# Patient Record
Sex: FEMALE | Race: WHITE
[De-identification: names, ages, dates, MRNs, and addresses within clinical notes are randomized per-mention and may not be internally consistent; named-entity substitution may affect disease eponyms.]

---

## 2017-12-28 ENCOUNTER — HOSPITAL ENCOUNTER (INPATIENT)
Dept: HOSPITAL 36 - ER | Age: 72
LOS: 5 days | Discharge: HOME | DRG: 190 | End: 2018-01-02
Payer: MEDICARE

## 2017-12-28 DIAGNOSIS — I50.9: ICD-10-CM

## 2017-12-28 DIAGNOSIS — Z87.891: ICD-10-CM

## 2017-12-28 DIAGNOSIS — J18.9: ICD-10-CM

## 2017-12-28 DIAGNOSIS — J44.0: ICD-10-CM

## 2017-12-28 DIAGNOSIS — Z91.013: ICD-10-CM

## 2017-12-28 DIAGNOSIS — E11.21: ICD-10-CM

## 2017-12-28 DIAGNOSIS — E66.01: ICD-10-CM

## 2017-12-28 DIAGNOSIS — I25.10: ICD-10-CM

## 2017-12-28 DIAGNOSIS — E11.65: ICD-10-CM

## 2017-12-28 DIAGNOSIS — E78.5: ICD-10-CM

## 2017-12-28 DIAGNOSIS — E11.40: ICD-10-CM

## 2017-12-28 DIAGNOSIS — J44.1: Primary | ICD-10-CM

## 2017-12-28 DIAGNOSIS — R09.02: ICD-10-CM

## 2017-12-28 DIAGNOSIS — Z95.5: ICD-10-CM

## 2017-12-28 DIAGNOSIS — G47.33: ICD-10-CM

## 2017-12-28 DIAGNOSIS — E11.22: ICD-10-CM

## 2017-12-28 DIAGNOSIS — E88.9: ICD-10-CM

## 2017-12-28 DIAGNOSIS — N18.9: ICD-10-CM

## 2017-12-28 DIAGNOSIS — J20.9: ICD-10-CM

## 2017-12-28 DIAGNOSIS — H54.8: ICD-10-CM

## 2017-12-28 DIAGNOSIS — I13.0: ICD-10-CM

## 2017-12-28 LAB
ANION GAP SERPL CALC-SCNC: 10.9 MMOL/L (ref 7–16)
BASOPHILS # BLD AUTO: 0 TH/CUMM (ref 0–0.2)
BASOPHILS NFR BLD AUTO: 0.6 % (ref 0–2)
BUN SERPL-MCNC: 34 MG/DL (ref 7–25)
CALCIUM SERPL-MCNC: 8.7 MG/DL (ref 8.6–10.3)
CHLORIDE SERPL-SCNC: 103 MEQ/L (ref 98–107)
CO2 SERPL-SCNC: 23.3 MEQ/L (ref 21–31)
CREAT SERPL-MCNC: 2.4 MG/DL (ref 0.6–1.2)
EOSINOPHIL # BLD AUTO: 0.1 TH/CMM (ref 0.1–0.4)
EOSINOPHIL NFR BLD AUTO: 1.2 % (ref 0–5)
ERYTHROCYTE [DISTWIDTH] IN BLOOD BY AUTOMATED COUNT: 13.7 % (ref 11.5–20)
GLUCOSE SERPL-MCNC: 295 MG/DL (ref 70–105)
HCT VFR BLD CALC: 33.5 % (ref 41–60)
HGB BLD-MCNC: 11.4 GM/DL (ref 12–16)
LYMPHOCYTE AB SER FC-ACNC: 1.3 TH/CMM (ref 1.5–3)
LYMPHOCYTES NFR BLD AUTO: 16.3 % (ref 20–50)
MCH RBC QN AUTO: 28.5 PG (ref 27–31)
MCHC RBC AUTO-ENTMCNC: 34 PG (ref 28–36)
MCV RBC AUTO: 83.7 FL (ref 81–100)
MONOCYTES # BLD AUTO: 0.5 TH/CMM (ref 0.3–1)
MONOCYTES NFR BLD AUTO: 6.7 % (ref 2–10)
NEUTROPHILS # BLD: 6.3 TH/CMM (ref 1.8–8)
NEUTROPHILS NFR BLD AUTO: 75.2 % (ref 40–80)
PLATELET # BLD: 252 TH/CMM (ref 150–400)
PMV BLD AUTO: 8.5 FL
POTASSIUM SERPL-SCNC: 4.2 MEQ/L (ref 3.5–5.1)
RBC # BLD AUTO: 4.01 MIL/CMM (ref 3.8–5.2)
SODIUM SERPL-SCNC: 133 MEQ/L (ref 136–145)
WBC # BLD AUTO: 8.2 TH/CMM (ref 4.8–10.8)

## 2017-12-28 PROCEDURE — Z7610: HCPCS

## 2017-12-28 NOTE — ED PHYSICIAN CHART
ED Chief Complaint/HPI





- Patient Information


Date Seen:: 12/28/17


Time Seen:: 21:00


Chief Complaint:: shortness of breath


History of Present Illness:: 





Patient's had shortness of breath since yesterday.  She denies chest pain.  

Patient has had mild nonproductive cough for 2 days.  Temperature not taken.


Historian:: Patient


Review:: Nurse's Note Reviewed





ED Review of Systems





- Review of Systems


General/Constitutional: No fever, No chills


Skin: No skin lesions


Head: No headache


Eyes: No loss of vision


ENT: No earache


Neck: No neck pain


Cardio Vascular: No chest pain, No palpitations


GI: No nausea, No vomiting


G/U: No dysuria


Endocrine: No polyuria, No polydipsia


Psychiatric: No prior psych history





ED Past Medical History





- Past Medical History


Past Medical History: HTN (diabetic neuropathy; legally blind), DM, Other


Family History: None


Social History: Non Smoker, No Alcohol


Surgical History: other (coronary stents)


Psychiatricy History: None





Family Medical History





- Family Member


  ** Mother


History Unknown: Yes





ED Physical Exam





- Physical Examination


General/Constitutional: Well-developed, well-nourished, Alert, No distress


Head: Atraumatic


Eyes: Lids, conjuctiva normal, PERRL


Skin: Nl inspection, No rash


ENMT: External ears, nose nl, TM canals nl, Nasal exam nl, Lips, teeth, gums nl


Other ENMT comments:: 





Dentures


Neck: No nuchal rigidity


Respiratory: Nl effort/Exclusion, No Wheeze/Rhonchi/Rales


Cardio Vascular: RRR, No murmur, gallop, rubs


GI: No tenderness/rebounding/guarding


: No CVA tenderness


Other Extremities comments:: 





2 out of 4 pretibial pitting edema


Neuro/Psych: No focal deficits


Misc: No paraspinal tenderness





ED Labs/Radiology/EKG Results





- Lab Results


Results: 





 Laboratory Results - last 24 hr











  12/28/17 12/28/17 12/28/17





  21:33 21:33 21:33


 


WBC  8.2  


 


RBC  4.01  


 


Hgb  11.4 L  


 


Hct  33.5 L  


 


MCV  83.7  


 


MCH  28.5  


 


MCHC Differential  34.0  


 


RDW  13.7  


 


Plt Count  252  


 


MPV  8.5  


 


Neutrophils %  75.2  


 


Lymphocytes %  16.3 L  


 


Monocytes %  6.7  


 


Eosinophils %  1.2  


 


Basophils %  0.6  


 


Sodium   133 L 


 


Potassium   4.2 


 


Chloride   103 


 


Carbon Dioxide   23.3 


 


Anion Gap   10.9 


 


BUN   34 H 


 


Creatinine   2.4 H 


 


Est GFR ( Amer)   TNP 


 


Est GFR (Non-Af Amer)   TNP 


 


BUN/Creatinine Ratio   14.2 


 


Glucose   295 H 


 


Calcium   8.7 


 


Troponin I   


 


B-Natriuretic Peptide    551.0 H














  12/28/17





  21:33


 


WBC 


 


RBC 


 


Hgb 


 


Hct 


 


MCV 


 


MCH 


 


MCHC Differential 


 


RDW 


 


Plt Count 


 


MPV 


 


Neutrophils % 


 


Lymphocytes % 


 


Monocytes % 


 


Eosinophils % 


 


Basophils % 


 


Sodium 


 


Potassium 


 


Chloride 


 


Carbon Dioxide 


 


Anion Gap 


 


BUN 


 


Creatinine 


 


Est GFR ( Amer) 


 


Est GFR (Non-Af Amer) 


 


BUN/Creatinine Ratio 


 


Glucose 


 


Calcium 


 


Troponin I  0.03


 


B-Natriuretic Peptide 














- Radiology Results


Results: 





CXR cardiomegaly and pulmonary congestion





- EKG Interpretations


Rate & Rhythm: NSR


Axis: normal


Comments:: 





poor R wave progression





ED Assessment





- Assessment


General Assessment: 





Patient felt better after the breathing treatment.  Age-adjusted BNP would be 

negative for congestive heart failure.  Patient stated she quit smoking 27 

years ago.  Patient's shortness of breath is most likely due to exacerbation of 

COPD not congestive heart failure





ED Septic Shock





- .


Is Septic Shock (SBP<90, OR Lactate>4 mmol\L) present?: No





ED Reassessment (Disposition)





- Reassessment


Reassessment Condition:: Improved





- Diagnosis


Diagnosis:: 





Exacerbation COPD; diabetes; hyperglycemia; legally blind





- Patient Disposition


Admitted to:: Telemetry


Admitting Medical Physician:: Tone Handy


Condition at Disposition:: Stable

## 2017-12-29 LAB
APPEARANCE UR: CLEAR
BACTERIA #/AREA URNS HPF: (no result) /HPF
BILIRUB UR-MCNC: NEGATIVE MG/DL
CHOLEST SERPL-MCNC: 139 MG/DL (ref ?–200)
COLOR UR: YELLOW
EPI CELLS URNS QL MICRO: (no result) /LPF
GLUCOSE UR STRIP-MCNC: >=1000 MG/DL
HBA1C MFR BLD: 7.7 % (ref 4–6)
HDLC SERPL-MCNC: 35 MG/DL (ref 23–92)
HGB BLD-MCNC: 16 G/DL (ref 4–35)
KETONES UR STRIP-MCNC: NEGATIVE MG/DL
LEUKOCYTE ESTERASE UR-ACNC: NEGATIVE
MICRO URNS: YES
NITRITE UR QL STRIP: NEGATIVE
PCO2 BLDA: 42 MMHG (ref 35–45)
PH UR STRIP: 6 [PH] (ref 4.6–8)
PO2 BLDA: 51 MMHG (ref 80–100)
PROT UR STRIP-MCNC: >=300 MG/DL
RBC # UR STRIP: (no result) /UL
RBC #/AREA URNS HPF: (no result) /HPF (ref 0–5)
SAO2 % BLDA: 86 % (ref 92–100)
SP GR UR STRIP: 1.01 (ref 1–1.03)
TRIGL SERPL-MCNC: 195 MG/DL (ref ?–150)
URINALYSIS COMPLETE PNL UR: (no result)
UROBILINOGEN UR STRIP-ACNC: 0.2 E.U./DL (ref 0.2–1)
WBC #/AREA URNS HPF: (no result) /HPF (ref 0–5)

## 2017-12-29 RX ADMIN — INSULIN ASPART SCH UNITS: 100 INJECTION, SOLUTION INTRAVENOUS; SUBCUTANEOUS at 16:35

## 2017-12-29 RX ADMIN — INSULIN ASPART SCH UNITS: 100 INJECTION, SOLUTION INTRAVENOUS; SUBCUTANEOUS at 10:49

## 2017-12-29 RX ADMIN — INSULIN ASPART SCH UNITS: 100 INJECTION, SOLUTION INTRAVENOUS; SUBCUTANEOUS at 10:50

## 2017-12-29 RX ADMIN — ASPIRIN 81 MG SCH MG: 81 TABLET ORAL at 18:21

## 2017-12-29 RX ADMIN — INSULIN ASPART SCH UNITS: 100 INJECTION, SOLUTION INTRAVENOUS; SUBCUTANEOUS at 20:45

## 2017-12-29 RX ADMIN — Medication SCH TAB: at 10:03

## 2017-12-29 RX ADMIN — INSULIN ASPART SCH UNITS: 100 INJECTION, SOLUTION INTRAVENOUS; SUBCUTANEOUS at 16:34

## 2017-12-29 RX ADMIN — INSULIN ASPART SCH UNITS: 100 INJECTION, SOLUTION INTRAVENOUS; SUBCUTANEOUS at 06:44

## 2017-12-29 NOTE — CARDIOLOGY
12/29/2017



ECHOCARDIOGRAM



PATIENT OF:  Dr. Tone Handy



M-MODE ECHOCARDIOGRAM:  Mitral valve, anterior leaflet of mitral valve shows

normal excursion, EF velocity.  Posterior leaflet of mitral valve shows normal

excursion.  Left ventricular posterior wall shows increased thickness, normal

excursion.  Interventricular septum shows increased thickness, normal excursion,

hypertrophy of the left ventricle, ejection fraction 62%.  Left atrium enlarged

4.5 cm.  Aortic root shows normal dimension, normal excursion of aortic

leaflets.



CONCLUSION:  Hypertrophy of the left ventricle, left atrial enlargement,

ejection fraction 62%.



2D ECHO:  Long axis view showed normal sized left ventricle with hypertrophy of

the left ventricle.  Left atrium enlarged.  Aortic root shows normal dimension,

normal excursion of aortic leaflets.  Short axis view of mitral valve normal. 

Short axis view of aortic valve normal.  Apical four chamber view showed normal

sized left ventricle with hypertrophy of the left ventricle.  Left atrium

enlarged.  Aortic root showed normal dimension, normal excursion of aortic

leaflets.  Short axis view of mitral valve normal.  Short axis view of aortic

valve normal.  Apical four chamber view showed normal sized left ventricle with

hypertrophy of the left ventricle.  Left atrium enlarged.  Right ventricular

cavity, right atrium normal, no pericardial effusion.



CONCLUSION:  Hypertrophy of the left ventricle.  Left atrial enlargement,

ejection fraction 62%.



Doppler study shows moderate mitral regurgitation, mild tricuspid regurgitation,

moderate aortic regurgitation.





DD: 12/29/2017 16:47

DT: 12/29/2017 16:57

[facility]# [id number]  [id number]

## 2017-12-29 NOTE — CONSULTATION
DATE OF CONSULTATION:     12/29/2017



ATTENDING PHYSICIAN:  Tone Handy M.D.



REASON FOR CONSULTATION:  Elevated BUN and creatinine.



HISTORY OF PRESENT ILLNESS:  The patient is a 72-year-old female with nearly 40

years history of diabetes mellitus.  She came into the Emergency Room

complaining of shortness of breath and cough with fever.  So, she was admitted

and she was found to have elevated BUN, creatinine.  So, I am called in

consultation as I am covering for Dr. David Dyson.



PAST MEDICAL HISTORY:  Significant for diabetes mellitus for 38 years,

hypertension for the past few years.  She is legally blind.  She has diabetic

neuropathy.  She is an ex-smoker.



FAMILY HISTORY:  Noncontributory.



SOCIAL HISTORY:  The patient does not drink.  She quit smoking 25 years ago.



ALLERGIES:  She is allergic to shellfish.



REVIEW OF SYSTEMS:  As in history of present illness, all other systems reviewed

and found to be negative.



PHYSICAL EXAMINATION:

VITAL SIGNS:  Her blood pressure is 172/64, temperature is 96.6, pulse is 68,

respiration 18.

HEENT:  Normocephalic, atraumatic.  Pupils equal, round, reacting to light and

accommodation.  Extraocular movements are intact.

CARDIOVASCULAR:  S1, S2 heard, regular rate and rhythm.

LUNGS:  Clear to auscultation bilaterally.

ABDOMEN:  Soft.  No hepatosplenomegaly.

EXTREMITIES:  There is no cyanosis, clubbing.  There is 1+ pedal edema.

NEUROLOGIC:  Cranial nerves 2-12 are intact.  There is no focal deficit.



LABORATORY DATA:  Urine shows 3+ protein and 3+ glucose, 5-10 RBCs, 0-2 WBCs. 

B-type natriuretic peptide is 550.  Sodium 133, potassium 4.2, chloride 103,

bicarbonate 23.3, BUN 34, creatinine 2.4, glucose 295, calcium 8.7.  Troponin

0.03.  Arterial blood gas shows a pH of 7.42, pCO2 of 42, pO2 of 51, bicarbonate

26.5, WBC 8.2, hemoglobin 11.4, hematocrit 33.5, platelet count 252.



ASSESSMENT AND PLAN:

1.  Elevated BUN and creatinine.  The patient knows that she has chronic kidney

disease and she was told that her creatinine was 2 point something.  We will 
check

24-hour urine.

2.  Diabetes mellitus, cover with sliding scale insulin.

3.  Hypertension.  Continue antihypertensive medications.

4.  Shortness of breath with hypoxia.  This is probably chronic obstructive

pulmonary disease exacerbation, given steroids and breathing treatments.



Thank you very much for the privilege of consulting on your patient Dr. Handy.





DD: 12/29/2017 11:32

DT: 12/29/2017 11:59

JOB# [id number]  [id number]

MTDD

## 2017-12-29 NOTE — DIAGNOSTIC IMAGING REPORT
Exam: Portable chest x-ray.



HISTORY: Shortness of breath.



Findings:



Portable examination of the chest at 2136 hours reviewed, no prior

studies available comparison.



The study demonstrates congestive heart failure. Mediastinal structures

midline the heart is not enlarged. There is evidence for left basilar

infiltrate and superimposed effusion. Bony thorax intact. The aortic

arch calcified. Right basal atelectasis.



IMPRESSION:

1. Congestive heart failure

2. Left basilar infiltrate superimposed effusion. Right basilar

atelectasis. Follow-up examination is recommended.

## 2017-12-29 NOTE — CONSULTATION
DATE OF CONSULTATION:  12/29/2017



PULMONARY AND CRITICAL CARE CONSULTATION



REFERRING PHYSICIAN:  Tone Handy MD.



REASON FOR REFERRAL:  Persistent coughing and wheezing.



CONSULT NOTE:  This is a 72-year-old female who has had history of less than

20-pack-year smoker, has not smoked for last 25 years, presents with history of

two days of coughing, fever, chills, and some phlegm, though could not see the

color of it with some shortness of breath and chest discomfort.  As the

patient's symptoms persisted, the patient came to the hospital for further care

and necessary treatment.  The patient did have some chills, feels like she may

have flu.  Denies of any post-sinus dribbling or denies of any other related

issue.



PAST MEDICAL HISTORY:  Hypertension, diabetes mellitus, dyslipoproteinemia,

morbid obesity, question of suspect obstructive sleep apnea syndrome, and

smoking history less than 25-pack-year smoker.



ALLERGIES:  ALLERGIC TO SHELLFISH.



PAST SURGICAL HISTORY:  Nil.



SOCIAL HISTORY:  Currently disabled, lives by herself.



FAMILY HISTORY:  Noncontributory.



PHYSICAL EXAMINATION:

GENERAL:  This is an elderly looking female, awake, pleasant, and not in acute

distress.

VITAL SIGNS:  The patient's recorded vitals:  Temperature is 98, blood pressure

174/71, respirations 18, temperature is 97, saturation okay.

HEENT:  Examination of the head is essentially unremarkable.  Pupils appears to

be equal and reactive to light.  Conjunctivae are pink.  Oral cavity shows small

oropharyngeal opening.

NECK:  No nodes in the neck could be palpated.

CHEST:  Shows occasional rhonchi with diminished air entry.

HEART:  Regular.

ABDOMEN:  Distended, soft, and nontender.

EXTREMITIES:  Shows no peripheral edema.



LABORATORY DATA:  The patient's blood gases shows pO2 of 51, saturation 86 on 2

liters per minute.  Other diagnostic studies is negative.  All blood chemistries

is not available.  The patient's white count is 8.2, hemoglobin 11.4, and blood

gas as mentioned above.  Chemistry:  Triglycerides 195, other things have not

done.  The patient's chest x-ray shows portable technique, some heavy

bronchovascular marking mostly in the right side.



IMPRESSION:

1.  The patient has acute tracheobronchitis, questionable infiltrate.

2.  Suspect viral syndrome.

3.  Severe obstructive sleep apnea syndrome with metabolic disorder.



PLANS AND SUGGESTIONS:  We will go ahead and get influenza screening.  We will

go ahead and increase bronchodilator and also get sputum studies and empiric

antibiotics.  We will get a CT of the chest and repeat the blood gasses, etc.

and see how she does and go from there.





DD: 12/29/2017 22:53

DT: 12/29/2017 23:25

JOB# [id number]  [id number]

## 2017-12-30 LAB
ALBUMIN SERPL-MCNC: 3 GM/DL (ref 3.7–5.3)
ALBUMIN/GLOB SERPL: 0.9 {RATIO} (ref 1–1.8)
ALP SERPL-CCNC: 77 U/L (ref 34–104)
ALT SERPL-CCNC: 7 U/L (ref 7–52)
ANION GAP SERPL CALC-SCNC: 16.5 MMOL/L (ref 7–16)
AST SERPL-CCNC: 8 U/L (ref 13–39)
BASOPHILS # BLD AUTO: 0 TH/CUMM (ref 0–0.2)
BILIRUB DIRECT SERPL-MCNC: 0.06 MG/DL (ref 0–0.2)
BILIRUB SERPL-MCNC: 0.3 MG/DL (ref 0.3–1)
BSA: 2.1 M2
BUN SERPL-MCNC: 46 MG/DL (ref 7–25)
CALCIUM SERPL-MCNC: 8.6 MG/DL (ref 8.6–10.3)
CHLORIDE SERPL-SCNC: 100 MEQ/L (ref 98–107)
CO2 SERPL-SCNC: 20.5 MEQ/L (ref 21–31)
COLLECT DURATION TIME UR: 24 HOURS
CREAT CL 24H UR+SERPL-VRATE: 28 ML/MIN (ref 88–128)
CREAT SERPL-MCNC: 2.2 MG/DL (ref 0.6–1.2)
CREAT UR-MCNC: 32 MG/DL (ref 28–217)
EOSINOPHIL # BLD AUTO: 0 TH/CMM (ref 0.1–0.4)
ERYTHROCYTE [DISTWIDTH] IN BLOOD BY AUTOMATED COUNT: 13.5 % (ref 11.5–20)
FLUAV AG NPH QL IA: (no result)
FLUBV AG NPH QL IA: (no result)
GLOBULIN SER-MCNC: 3.3 GM/DL
GLUCOSE SERPL-MCNC: 459 MG/DL (ref 70–105)
HCT VFR BLD CALC: 32.6 % (ref 41–60)
HGB BLD-MCNC: 11.1 GM/DL (ref 12–16)
LYMPHOCYTE AB SER FC-ACNC: 0.8 TH/CMM (ref 1.5–3)
MANUAL DIFFERENTIAL PERFORMED BLD QL: YES
MCH RBC QN AUTO: 29.3 PG (ref 27–31)
MCHC RBC AUTO-ENTMCNC: 34.2 PG (ref 28–36)
MCV RBC AUTO: 85.9 FL (ref 81–100)
MONOCYTES # BLD AUTO: 0.1 TH/CMM (ref 0.3–1)
NEUTROPHILS # BLD: 9 TH/CMM (ref 1.8–8)
PCO2 BLDA: 40 MMHG (ref 35–45)
PLATELET # BLD: 235 TH/CMM (ref 150–400)
PMV BLD AUTO: 9.1 FL
PO2 BLDA: 72 MMHG (ref 80–100)
POTASSIUM SERPL-SCNC: 4 MEQ/L (ref 3.5–5.1)
PROT UR-MCNC: 548 MG/DL
RBC # BLD AUTO: 3.79 MIL/CMM (ref 3.8–5.2)
SAO2 % BLDA: 93 % (ref 92–100)
SODIUM SERPL-SCNC: 133 MEQ/L (ref 136–145)
SPECIMEN VOL UR: 3350 ML
WBC # BLD AUTO: 9.9 TH/CMM (ref 4.8–10.8)

## 2017-12-30 RX ADMIN — INSULIN DETEMIR SCH UNITS: 100 INJECTION, SOLUTION SUBCUTANEOUS at 22:40

## 2017-12-30 RX ADMIN — INSULIN ASPART SCH UNITS: 100 INJECTION, SOLUTION INTRAVENOUS; SUBCUTANEOUS at 06:40

## 2017-12-30 RX ADMIN — ASPIRIN 81 MG SCH MG: 81 TABLET ORAL at 09:27

## 2017-12-30 RX ADMIN — INSULIN ASPART SCH UNITS: 100 INJECTION, SOLUTION INTRAVENOUS; SUBCUTANEOUS at 12:42

## 2017-12-30 RX ADMIN — BUDESONIDE SCH MG: 0.5 SUSPENSION RESPIRATORY (INHALATION) at 07:52

## 2017-12-30 RX ADMIN — Medication SCH TAB: at 09:25

## 2017-12-30 RX ADMIN — INSULIN ASPART SCH UNITS: 100 INJECTION, SOLUTION INTRAVENOUS; SUBCUTANEOUS at 17:52

## 2017-12-30 RX ADMIN — IPRATROPIUM BROMIDE AND ALBUTEROL SULFATE SCH: .5; 3 SOLUTION RESPIRATORY (INHALATION) at 14:56

## 2017-12-30 RX ADMIN — INSULIN ASPART SCH UNITS: 100 INJECTION, SOLUTION INTRAVENOUS; SUBCUTANEOUS at 06:39

## 2017-12-30 RX ADMIN — IPRATROPIUM BROMIDE AND ALBUTEROL SULFATE SCH ML: .5; 3 SOLUTION RESPIRATORY (INHALATION) at 07:48

## 2017-12-30 RX ADMIN — IPRATROPIUM BROMIDE AND ALBUTEROL SULFATE SCH ML: .5; 3 SOLUTION RESPIRATORY (INHALATION) at 19:31

## 2017-12-30 RX ADMIN — BUDESONIDE SCH MG: 0.5 SUSPENSION RESPIRATORY (INHALATION) at 19:31

## 2017-12-30 RX ADMIN — INSULIN ASPART SCH: 100 INJECTION, SOLUTION INTRAVENOUS; SUBCUTANEOUS at 17:55

## 2017-12-30 RX ADMIN — INSULIN DETEMIR SCH UNITS: 100 INJECTION, SOLUTION SUBCUTANEOUS at 17:50

## 2017-12-30 RX ADMIN — INSULIN ASPART SCH UNITS: 100 INJECTION, SOLUTION INTRAVENOUS; SUBCUTANEOUS at 22:39

## 2017-12-30 RX ADMIN — IPRATROPIUM BROMIDE AND ALBUTEROL SULFATE SCH ML: .5; 3 SOLUTION RESPIRATORY (INHALATION) at 15:29

## 2017-12-30 NOTE — DIAGNOSTIC IMAGING REPORT
Renal ultrasound



HISTORY: Abnormal renal function test



The right kidney is normal in size (10.7 x 5.8 x 5.5 cm). 1.5 cm

sonolucent lesion is noted in the lower mid cortex consistent with a

cyst. No hydronephrosis.



The left kidney measures 11.3 x 5.5 x 5.6 cm. A 2.0 cm sonolucent lesion

is noted in the midportion of the kidney consistent with a cyst. No

hydronephrosis.



No intraluminal abnormalities seen within the urinary bladder.



IMPRESSION:

1. Limited exam due to patient's size and body habitus

2. Findings consistent with bilateral renal cysts

## 2017-12-31 LAB
ANION GAP SERPL CALC-SCNC: 10.9 MMOL/L (ref 7–16)
BUN SERPL-MCNC: 67 MG/DL (ref 7–25)
CALCIUM SERPL-MCNC: 8.2 MG/DL (ref 8.6–10.3)
CHLORIDE SERPL-SCNC: 104 MEQ/L (ref 98–107)
CO2 SERPL-SCNC: 22.9 MEQ/L (ref 21–31)
CREAT SERPL-MCNC: 2.6 MG/DL (ref 0.6–1.2)
GLUCOSE SERPL-MCNC: 234 MG/DL (ref 70–105)
POTASSIUM SERPL-SCNC: 3.8 MEQ/L (ref 3.5–5.1)
SODIUM SERPL-SCNC: 134 MEQ/L (ref 136–145)

## 2017-12-31 RX ADMIN — INSULIN ASPART SCH UNITS: 100 INJECTION, SOLUTION INTRAVENOUS; SUBCUTANEOUS at 12:32

## 2017-12-31 RX ADMIN — INSULIN ASPART SCH UNITS: 100 INJECTION, SOLUTION INTRAVENOUS; SUBCUTANEOUS at 04:46

## 2017-12-31 RX ADMIN — BUDESONIDE SCH MG: 0.5 SUSPENSION RESPIRATORY (INHALATION) at 07:29

## 2017-12-31 RX ADMIN — IPRATROPIUM BROMIDE AND ALBUTEROL SULFATE SCH ML: .5; 3 SOLUTION RESPIRATORY (INHALATION) at 15:11

## 2017-12-31 RX ADMIN — INSULIN ASPART SCH UNITS: 100 INJECTION, SOLUTION INTRAVENOUS; SUBCUTANEOUS at 02:32

## 2017-12-31 RX ADMIN — INSULIN DETEMIR SCH UNIT: 100 INJECTION, SOLUTION SUBCUTANEOUS at 21:41

## 2017-12-31 RX ADMIN — INSULIN ASPART SCH: 100 INJECTION, SOLUTION INTRAVENOUS; SUBCUTANEOUS at 07:03

## 2017-12-31 RX ADMIN — IPRATROPIUM BROMIDE AND ALBUTEROL SULFATE SCH ML: .5; 3 SOLUTION RESPIRATORY (INHALATION) at 11:48

## 2017-12-31 RX ADMIN — BUDESONIDE SCH: 0.5 SUSPENSION RESPIRATORY (INHALATION) at 19:25

## 2017-12-31 RX ADMIN — Medication SCH TAB: at 09:00

## 2017-12-31 RX ADMIN — INSULIN ASPART SCH: 100 INJECTION, SOLUTION INTRAVENOUS; SUBCUTANEOUS at 07:04

## 2017-12-31 RX ADMIN — ASPIRIN 81 MG SCH MG: 81 TABLET ORAL at 09:02

## 2017-12-31 RX ADMIN — INSULIN ASPART SCH UNITS: 100 INJECTION, SOLUTION INTRAVENOUS; SUBCUTANEOUS at 17:46

## 2017-12-31 RX ADMIN — IPRATROPIUM BROMIDE AND ALBUTEROL SULFATE SCH: .5; 3 SOLUTION RESPIRATORY (INHALATION) at 19:25

## 2017-12-31 RX ADMIN — INSULIN ASPART SCH UNITS: 100 INJECTION, SOLUTION INTRAVENOUS; SUBCUTANEOUS at 21:40

## 2017-12-31 RX ADMIN — IPRATROPIUM BROMIDE AND ALBUTEROL SULFATE SCH ML: .5; 3 SOLUTION RESPIRATORY (INHALATION) at 07:18

## 2017-12-31 RX ADMIN — INSULIN ASPART SCH UNITS: 100 INJECTION, SOLUTION INTRAVENOUS; SUBCUTANEOUS at 00:19

## 2018-01-01 LAB
ANION GAP SERPL CALC-SCNC: 10.1 MMOL/L (ref 7–16)
BASOPHILS # BLD AUTO: 0 TH/CUMM (ref 0–0.2)
BASOPHILS NFR BLD AUTO: 0.5 % (ref 0–2)
BUN SERPL-MCNC: 64 MG/DL (ref 7–25)
CALCIUM SERPL-MCNC: 8.1 MG/DL (ref 8.6–10.3)
CHLORIDE SERPL-SCNC: 103 MEQ/L (ref 98–107)
CO2 SERPL-SCNC: 26.5 MEQ/L (ref 21–31)
CREAT SERPL-MCNC: 2.3 MG/DL (ref 0.6–1.2)
EOSINOPHIL # BLD AUTO: 0.1 TH/CMM (ref 0.1–0.4)
EOSINOPHIL NFR BLD AUTO: 0.8 % (ref 0–5)
ERYTHROCYTE [DISTWIDTH] IN BLOOD BY AUTOMATED COUNT: 13.5 % (ref 11.5–20)
GLUCOSE SERPL-MCNC: 195 MG/DL (ref 70–105)
HCT VFR BLD CALC: 31.6 % (ref 41–60)
HGB BLD-MCNC: 10.9 GM/DL (ref 12–16)
LYMPHOCYTE AB SER FC-ACNC: 1.7 TH/CMM (ref 1.5–3)
LYMPHOCYTES NFR BLD AUTO: 18.8 % (ref 20–50)
MCH RBC QN AUTO: 29.3 PG (ref 27–31)
MCHC RBC AUTO-ENTMCNC: 34.4 PG (ref 28–36)
MCV RBC AUTO: 85 FL (ref 81–100)
MONOCYTES # BLD AUTO: 0.5 TH/CMM (ref 0.3–1)
MONOCYTES NFR BLD AUTO: 5.9 % (ref 2–10)
NEUTROPHILS # BLD: 6.9 TH/CMM (ref 1.8–8)
NEUTROPHILS NFR BLD AUTO: 74 % (ref 40–80)
PLATELET # BLD: 264 TH/CMM (ref 150–400)
PMV BLD AUTO: 9 FL
POTASSIUM SERPL-SCNC: 3.6 MEQ/L (ref 3.5–5.1)
RBC # BLD AUTO: 3.72 MIL/CMM (ref 3.8–5.2)
SODIUM SERPL-SCNC: 136 MEQ/L (ref 136–145)
WBC # BLD AUTO: 9.2 TH/CMM (ref 4.8–10.8)

## 2018-01-01 RX ADMIN — IPRATROPIUM BROMIDE AND ALBUTEROL SULFATE SCH ML: .5; 3 SOLUTION RESPIRATORY (INHALATION) at 07:24

## 2018-01-01 RX ADMIN — Medication SCH TAB: at 09:18

## 2018-01-01 RX ADMIN — BUDESONIDE SCH MG: 0.5 SUSPENSION RESPIRATORY (INHALATION) at 18:43

## 2018-01-01 RX ADMIN — INSULIN ASPART SCH UNITS: 100 INJECTION, SOLUTION INTRAVENOUS; SUBCUTANEOUS at 06:30

## 2018-01-01 RX ADMIN — IPRATROPIUM BROMIDE AND ALBUTEROL SULFATE SCH ML: .5; 3 SOLUTION RESPIRATORY (INHALATION) at 14:40

## 2018-01-01 RX ADMIN — ASPIRIN 81 MG SCH MG: 81 TABLET ORAL at 09:27

## 2018-01-01 RX ADMIN — INSULIN ASPART SCH UNITS: 100 INJECTION, SOLUTION INTRAVENOUS; SUBCUTANEOUS at 20:24

## 2018-01-01 RX ADMIN — ALBUTEROL SULFATE SCH MG: 2.5 SOLUTION RESPIRATORY (INHALATION) at 10:44

## 2018-01-01 RX ADMIN — INSULIN ASPART SCH UNITS: 100 INJECTION, SOLUTION INTRAVENOUS; SUBCUTANEOUS at 17:47

## 2018-01-01 RX ADMIN — INSULIN DETEMIR SCH UNIT: 100 INJECTION, SOLUTION SUBCUTANEOUS at 20:24

## 2018-01-01 RX ADMIN — INSULIN ASPART SCH UNITS: 100 INJECTION, SOLUTION INTRAVENOUS; SUBCUTANEOUS at 12:21

## 2018-01-01 RX ADMIN — IPRATROPIUM BROMIDE AND ALBUTEROL SULFATE SCH ML: .5; 3 SOLUTION RESPIRATORY (INHALATION) at 14:39

## 2018-01-01 RX ADMIN — IPRATROPIUM BROMIDE AND ALBUTEROL SULFATE SCH ML: .5; 3 SOLUTION RESPIRATORY (INHALATION) at 18:43

## 2018-01-01 RX ADMIN — IPRATROPIUM BROMIDE SCH MG: 0.5 SOLUTION RESPIRATORY (INHALATION) at 10:44

## 2018-01-01 NOTE — GENERAL PROGRESS NOTE
Subjective





- Review of Systems


Service Date: 18


Subjective: 





patient seen and examined chart reviewed patient doing well denied chest pain 

or shortness of breath





Objective





- Results


Result Diagrams: 


 18 05:50





 18 05:50


Recent Labs: 


 Laboratory Last Values











WBC  9.2 Th/cmm (4.8-10.8)   18  05:50    


 


RBC  3.72 Mil/cmm (3.80-5.20)  L  18  05:50    


 


Hgb  10.9 gm/dL (12-16)  L  18  05:50    


 


Hct  31.6 % (41.0-60)  L  18  05:50    


 


MCV  85.0 fl ()   18  05:50    


 


MCH  29.3 pg (27.0-31.0)   18  05:50    


 


MCHC Differential  34.4 pg (28.0-36.0)   18  05:50    


 


RDW  13.5 % (11.5-20.0)   18  05:50    


 


Plt Count  264 Th/cmm (150-400)   18  05:50    


 


MPV  9.0 fl  18  05:50    


 


Neutrophils %  74.0 % (40.0-80.0)   18  05:50    


 


Lymphocytes %  18.8 % (20.0-50.0)  L  18  05:50    


 


Monocytes %  5.9 % (2.0-10.0)   18  05:50    


 


Eosinophils %  0.8 % (0.0-5.0)   18  05:50    


 


Basophils %  0.5 % (0.0-2.0)   18  05:50    


 


Specimen Source  Arterial   17  13:45    


 


Sample Site  Right Radial   17  13:45    


 


pH  7.34  (7.35-7.45)  L  17  13:45    


 


pCO2  40.0 mmHg (35.0-45.0)   17  13:45    


 


pO2  72.0 mmHg (80.0-100.0)  L  17  13:45    


 


HCO3  21.8 mEq/L (20.0-26.0)   17  13:45    


 


Base Excess  -3.9 mEq/L (-3.0-3.0)  L  17  13:45    


 


O2 Saturation  93.0 % (92.0-100.0)   17  13:45    


 


Lan Test  Positive   17  13:45    


 


Vent Rate  NA   17  13:45    


 


Inspired O2  21   17  13:45    


 


Tidal Volume  NA   17  13:45    


 


PEEP  NA   17  13:45    


 


Pressure (ins/psv/peep)  NA   17  13:45    


 


Critical Value  ELENA PELON   17  13:45    


 


Sodium  136 mEq/L (136-145)   18  05:50    


 


Potassium  3.6 mEq/L (3.5-5.1)   18  05:50    


 


Chloride  103 mEq/L ()   18  05:50    


 


Carbon Dioxide  26.5 mEq/L (21.0-31.0)   18  05:50    


 


Anion Gap  10.1  (7.0-16.0)   18  05:50    


 


BUN  64 mg/dL (7-25)  H  18  05:50    


 


Creatinine  2.3 mg/dL (0.6-1.2)  H  18  05:50    


 


Est GFR ( Amer)  TNP   18  05:50    


 


Est GFR (Non-Af Amer)  TNP   18  05:50    


 


BUN/Creatinine Ratio  27.8   18  05:50    


 


Glucose  195 mg/dL ()  H D 18  05:50    


 


POC Glucose  435 MG/DL (70 - 105)  H  18  20:18    


 


Hemoglobin A1c %  7.7 % (4.0-6.0)  H  17  21:33    


 


Calcium  8.1 mg/dL (8.6-10.3)  L  18  05:50    


 


Total Bilirubin  0.3 mg/dL (0.3-1.0)   17  05:30    


 


Direct Bilirubin  0.06 mg/dL (0.0-0.2)   17  05:30    


 


AST  8 U/L (13-39)  L  17  05:30    


 


ALT  7 U/L (7-52)   17  05:30    


 


Alkaline Phosphatase  77 U/L ()   17  05:30    


 


Troponin I  0.02 ng/mL (0.01-0.05)   17  13:35    


 


B-Natriuretic Peptide  551.0 pg/mL (5.0-100.0)  H  17  21:33    


 


Total Protein  6.3 gm/dL (6.0-8.3)   17  05:30    


 


Albumin  3.0 gm/dL (3.7-5.3)  L  17  05:30    


 


Globulin  3.3 gm/dL  17  05:30    


 


Albumin/Globulin Ratio  0.9  (1.0-1.8)  L  17  05:30    


 


Triglycerides  195 mg/dL (<150)  H  17  05:15    


 


Cholesterol  139 mg/dL (<200)   17  05:15    


 


LDL Cholesterol Direct  75 mg/dL ()   17  05:15    


 


HDL Cholesterol  35 mg/dL (23-92)   17  05:15    


 


TSH  0.37 uIU/ml (0.34-5.60)   17  05:30    


 


Urine Source  CLEAN C   17  10:24    


 


Urine Color  YELLOW   17  10:24    


 


Urine Clarity  CLEAR  (CLEAR)   17  10:24    


 


Urine pH  6.0  (4.6 - 8.0)   17  10:24    


 


Ur Specific Gravity  1.015  (1.005-1.030)   17  10:24    


 


Urine Protein  >=300 mg/dL (NEGATIVE)   17  10:24    


 


Urine Glucose (UA)  >=1000 mg/dL (NEGATIVE)  H  17  10:24    


 


Urine Ketones  NEGATIVE mg/dL (NEGATIVE)   17  10:24    


 


Urine Blood  MODERATE  (NEGATIVE)  H  17  10:24    


 


Urine Nitrate  NEGATIVE  (NEGATIVE)   17  10:24    


 


Urine Bilirubin  NEGATIVE  (NEGATIVE)   17  10:24    


 


Urine Urobilinogen  0.2 E.U./dL (0.2 - 1.0)   17  10:24    


 


Ur Leukocyte Esterase  NEGATIVE  (NEGATIVE)   17  10:24    


 


Urine RBC  5-10 /hpf (0-5)  H  17  10:24    


 


Urine WBC  0-2 /hpf (0-5)   17  10:24    


 


Ur Epithelial Cells  FEW /lpf (FEW)   17  10:24    


 


Urine Bacteria  1+ /hpf (NONE SEEN)  H  17  10:24    


 


U Random Total Protein  548.0 mg/dL  17  12:00    


 


Urine Collection Time  24 hours  17  12:00    


 


Urine Total Volume  3350 ml  17  12:00    


 


Urine Creatinine  32.0 mg/dl (28.0-217.0)   17  12:00    


 


Creat Clearance 24 Hr  28 ml/min (88..00)  L  17  12:00    


 


U Tot Protein 24h, Calc  12093.0 mg/24 hr (0-165)  H  17  12:00    


 


Body Surface Area  2.10   17  12:00    


 


Influenza A (Rapid)  NEG FOR INF A   17  23:25    


 


Influenza B (Rapid)  NEG FOR INF B   17  23:25    














- Physical Exam


Vitals and I&O: 


 Vital Signs











Temp  97.7 F   18 16:00


 


Pulse  65   18 18:00


 


Resp  19   18 16:00


 


BP  145/68   18 18:00


 


Pulse Ox  97   18 16:00








 Intake & Output











 18





 06:59 18:59 06:59


 


Intake Total 50  850


 


Balance 50  850


 


Weight (lbs) 110.903 kg  110.903 kg


 


Intake:   


 


  Intake, IV Amount 50  


 


    cefTRIAXone 1 gm In 50  





    Sodium Chloride 0.9% 50   





    ml @ 100 mls/hr IV Q24HR   





    [facility] Rx#:[id number]   


 


  Oral   850


 


Other:   


 


  # Voids   5


 


  # Bowel Movements   0











Active Medications: 


Current Medications





Albuterol Sulfate (Albuterol 2.5mg/3ml Neb Ud)  2.5 mg HHN [id number] CALI


   Stop: 18 06:59


   Last Admin: 18 10:44 Dose:  2.5 mg


Albuterol/Ipratropium (Duoneb Neb)  3 ml HHN Q2HR PRN


   PRN Reason: Shortness of Breath


   Stop: 18 02:21


   Last Admin: 17 02:56 Dose:  3 ml


Albuterol/Ipratropium (Duoneb Neb)  3 ml HHN [id number] [facility]


   Stop: 18 06:59


   Last Admin: 18 18:43 Dose:  3 ml


Aspirin (Aspirin Chewable)  81 mg PO DAILY [facility]


   Stop: 18 18:14


   Last Admin: 18 09:27 Dose:  81 mg


Budesonide (Pulmicort)  0.5 mg HHN BIDRT [facility]


   Stop: 18 06:59


   Last Admin: 18 18:43 Dose:  0.5 mg


Carvedilol (Coreg)  18.75 mg PO BID [facility]


   Stop: 18 09:59


   Last Admin: 18 18:00 Dose:  18.75 mg


Clopidogrel Bisulfate (Plavix)  75 mg PO DAILY [facility]


   Stop: 18 08:59


   Last Admin: 18 09:19 Dose:  75 mg


Ceftriaxone Sodium 1 gm/ (Sodium Chloride)  50 mls @ 100 mls/hr IV Q24HR [facility]


   Stop: 18 20:59


   Last Admin: 18 20:23 Dose:  100 mls/hr


Insulin Aspart (Novolog Insulin Sliding Scale)  0 units SUBQ ACHS [facility]


   PRN Reason: Protocol


   Stop: 18 07:29


   Last Admin: 18 20:24 Dose:  12 units


Insulin Detemir (Levemir Insulin)  25 units SUBQ HS [facility]I


   PRN Reason: Protocol


   Stop: 18 08:37


   Last Admin: 18 20:24 Dose:  25 unit


Ipratropium Bromide (Atrovent Neb 0.5mg/2.5ml)  0.5 mg HHN [id number] [facility]


   Stop: 18 06:59


   Last Admin: 18 10:44 Dose:  0.5 mg


Losartan Potassium (Cozaar)  100 mg PO DAILY [facility]


   Stop: 18 08:59


   Last Admin: 18 09:34 Dose:  Not Given


Miscellaneous (Probiotic Screen)  1 ea MC PRN PRN


   PRN Reason: PROTOCOL


   Stop: 18 16:51


Miscellaneous (Clinical Monitoring)  1 ea MC DAILY PRN


   PRN Reason: RENAL


   Stop: 18 09:24


Oseltamivir Phosphate (Tamiflu)  75 mg PO BID [facility]


   Stop: 18 22:59


   Last Admin: 18 17:32 Dose:  Not Given


Prednisone (Deltasone)  20 mg PO DAILY [facility]


   Stop: 18 08:59


   Last Admin: 18 09:19 Dose:  20 mg


Simvastatin (Zocor)  40 mg PO DAILY [facility]


   PRN Reason: Protocol


   Stop: 18 20:59


   Last Admin: 18 09:18 Dose:  40 mg








Cardiovascular: Regular rate


Lungs: Clear to auscultation





Assessment/Plan





- Assessment


Assessment: 





CHF


COPD exacerbation


DM with hyperglycemia


DM with Nephropathy


HTN





- Plan


Plan: 


DC prednisone 


Monitor blood sugar


Basal bolus insulin coverage


Antibiotics HHN 


Plan of care discussed with nursing staff





Nutritional Asmnt/Malnutr-PDOC





- Dietary Evaluation


Malnutrition Findings (Please click <Entered> for more info): 








Nutritional Asmnt/Malnutrition                             Start:  17 16:

46


Text:                                                      Status: Active      

  


Freq:                                                                          

  


 Document     17 16:47  LCESTEFANIG  (Rec: 17 17:02  YASMIN [id number])


 Nutritional Asmnt/Malnutrition


     Patient General Information


      Nutritional Screening                      High Risk


                                                 Consult


      Diagnosis                                  COPD exacerbation


      Pertinent Medical Hx/Surgical Hx           HTN, diabetic neuropathy,


                                                 legally blind, DM, coronary


                                                 stents


      Subjective Information                     Consult received for


                                                 hyperglycemia. Pt seen lying


                                                 in bed, awake and alert during


                                                 the time of visit. Pt


                                                 reported good appetite,


                                                 consumed most of breakfast


                                                 except banana. Pt likes


                                                 vegetables, follow diabetic


                                                 diet at home, avoid sugar food


                                                 . Pt reported 18lb wt loss d/t


                                                 hospitalization and eating


                                                 diabetic diet at hospital, and


                                                 then gained back 6lb d/t


                                                 holiday. Pt showed interets of


                                                 more information about


                                                 diabetic diet, asking for


                                                 brochures. Pt appeared no fat/


                                                 muscle wasting.


      Current Diet Order/ Nutrition Support      [facility] 45gm, 1800 ADA


      Pertinent Medications                      novolog


      Pertinent Labs                              na 133, K 4.2, Cl 103,


                                                 BUN 34, Cr 2.4, Glucose 295,


                                                 A1C 7.7,


                                                  -472


     Nutritional Hx/Data


      Height                                     1.65 m


      Height (Calculated Centimeters)            165.1


      Current Weight (lbs)                       104.326 kg


      Weight (Calculated Kilograms)              104.3


      Weight (Calculated Grams)                  577368.2


      Ideal Body Weight                          125


      % Ideal Body Weight                        184


      Body Mass Index (BMI)                      38.2


      Weight Status                              Obese


     GI Symptoms


      GI Symptoms                                None


      Last BM                                    no records


      Difficult in:                              None


      Usual diet at home                         avoid sugar food, likes


                                                 vegetabls


      Skin Integrity/Comment:                    Patient noted to both elbow


                                                 with scaly,dry patches rash.


     Estimated Nutritional Goals


      BEE in Kcals:                              Adj wt of IBW


      Calories/Kcals/Kg                          25-30


      Kcals Calculated                           7865-5893


      Protein:                                   Adj wt of IBW


      Protein g/k-1.2


      Protein Calculated                         69-83


      Fluid: ml                                  0313-1586


     Nutritional Problem


      2. Problem


       Problem                                   overweight


       Etiology                                  possible excessive energy


                                                 intake


       Signs/Symptoms:                           BMI 38.3


      1. Problem


       Problem                                   altered nutrition related lab


                                                 values


       Etiology                                  hx of DM


       Signs/Symptoms:                           Glucose 295, A1C 7.7, -


                                                 472


     Malnutrition Alert


      Protein-Calorie Malnutrition               N/A


      Is there a minimum of two criteria         No


       selected?                                 


       Query Text:Check all the applicable       


       criteria. A minimum of two criteria are   


       recommended for diagnosis of either       


       severe or non-severe malnutrition.        


     Intervention/Recommendation


      Comments                                   1. Continue with current diet


                                                 as ordered. Provided diabetes


                                                 education and education


                                                 materials, pt showed interests


                                                 to read it.


                                                 2. Monitor PO intake, wt, labs


                                                 and skin integrity


                                                 3. F/U as moderate risk in 3-5


                                                 days, -1/3


     Expected Outcomes/Goals


      Expected Outcomes/Goals                    1. PO intake to meet at least


                                                 75% of nutritional needs.


                                                 2. Wt stability, skin to


                                                 remain intact, labs to


                                                 approach WNL.

## 2018-01-02 RX ADMIN — ALBUTEROL SULFATE SCH MG: 2.5 SOLUTION RESPIRATORY (INHALATION) at 07:05

## 2018-01-02 RX ADMIN — ALBUTEROL SULFATE SCH MG: 2.5 SOLUTION RESPIRATORY (INHALATION) at 11:15

## 2018-01-02 RX ADMIN — IPRATROPIUM BROMIDE SCH MG: 0.5 SOLUTION RESPIRATORY (INHALATION) at 11:15

## 2018-01-02 RX ADMIN — IPRATROPIUM BROMIDE SCH MG: 0.5 SOLUTION RESPIRATORY (INHALATION) at 07:05

## 2018-01-02 RX ADMIN — BUDESONIDE SCH MG: 0.5 SUSPENSION RESPIRATORY (INHALATION) at 07:06

## 2018-01-02 RX ADMIN — Medication SCH TAB: at 09:28

## 2018-01-02 RX ADMIN — INSULIN ASPART SCH: 100 INJECTION, SOLUTION INTRAVENOUS; SUBCUTANEOUS at 06:30

## 2018-01-02 RX ADMIN — IPRATROPIUM BROMIDE SCH MG: 0.5 SOLUTION RESPIRATORY (INHALATION) at 14:44

## 2018-01-02 RX ADMIN — INSULIN ASPART SCH UNITS: 100 INJECTION, SOLUTION INTRAVENOUS; SUBCUTANEOUS at 11:13

## 2018-01-02 RX ADMIN — BUDESONIDE SCH MG: 0.5 SUSPENSION RESPIRATORY (INHALATION) at 07:05

## 2018-01-02 RX ADMIN — ALBUTEROL SULFATE SCH MG: 2.5 SOLUTION RESPIRATORY (INHALATION) at 14:44

## 2018-01-02 RX ADMIN — ASPIRIN 81 MG SCH MG: 81 TABLET ORAL at 09:27

## 2018-01-02 NOTE — GENERAL PROGRESS NOTE
Subjective





- Review of Systems


Service Date: 18


Subjective: 





patient seen and examined doing better denied any complaints family was at the 

bedside





Objective





- Results


Result Diagrams: 


 18 05:50





 18 05:50


Recent Labs: 


 Laboratory Last Values











WBC  9.2 Th/cmm (4.8-10.8)   18  05:50    


 


RBC  3.72 Mil/cmm (3.80-5.20)  L  18  05:50    


 


Hgb  10.9 gm/dL (12-16)  L  18  05:50    


 


Hct  31.6 % (41.0-60)  L  18  05:50    


 


MCV  85.0 fl ()   18  05:50    


 


MCH  29.3 pg (27.0-31.0)   18  05:50    


 


MCHC Differential  34.4 pg (28.0-36.0)   18  05:50    


 


RDW  13.5 % (11.5-20.0)   18  05:50    


 


Plt Count  264 Th/cmm (150-400)   18  05:50    


 


MPV  9.0 fl  18  05:50    


 


Neutrophils %  74.0 % (40.0-80.0)   18  05:50    


 


Lymphocytes %  18.8 % (20.0-50.0)  L  18  05:50    


 


Monocytes %  5.9 % (2.0-10.0)   18  05:50    


 


Eosinophils %  0.8 % (0.0-5.0)   18  05:50    


 


Basophils %  0.5 % (0.0-2.0)   18  05:50    


 


Specimen Source  Arterial   17  13:45    


 


Sample Site  Right Radial   17  13:45    


 


pH  7.34  (7.35-7.45)  L  17  13:45    


 


pCO2  40.0 mmHg (35.0-45.0)   17  13:45    


 


pO2  72.0 mmHg (80.0-100.0)  L  17  13:45    


 


HCO3  21.8 mEq/L (20.0-26.0)   17  13:45    


 


Base Excess  -3.9 mEq/L (-3.0-3.0)  L  17  13:45    


 


O2 Saturation  93.0 % (92.0-100.0)   17  13:45    


 


Lan Test  Positive   17  13:45    


 


Vent Rate  NA   17  13:45    


 


Inspired O2  21   17  13:45    


 


Tidal Volume  NA   17  13:45    


 


PEEP  NA   17  13:45    


 


Pressure (ins/psv/peep)  NA   17  13:45    


 


Critical Value  ELENA BIRD   17  13:45    


 


Sodium  136 mEq/L (136-145)   18  05:50    


 


Potassium  3.6 mEq/L (3.5-5.1)   18  05:50    


 


Chloride  103 mEq/L ()   18  05:50    


 


Carbon Dioxide  26.5 mEq/L (21.0-31.0)   18  05:50    


 


Anion Gap  10.1  (7.0-16.0)   18  05:50    


 


BUN  64 mg/dL (7-25)  H  18  05:50    


 


Creatinine  2.3 mg/dL (0.6-1.2)  H  18  05:50    


 


Est GFR ( Amer)  TNP   18  05:50    


 


Est GFR (Non-Af Amer)  TNP   18  05:50    


 


BUN/Creatinine Ratio  27.8   18  05:50    


 


Glucose  425 mg/dL ()  H D 18  20:46    


 


POC Glucose  231 MG/DL (70 - 105)  H  18  10:50    


 


Hemoglobin A1c %  7.7 % (4.0-6.0)  H  17  21:33    


 


Calcium  8.1 mg/dL (8.6-10.3)  L  18  05:50    


 


Total Bilirubin  0.3 mg/dL (0.3-1.0)   17  05:30    


 


Direct Bilirubin  0.06 mg/dL (0.0-0.2)   17  05:30    


 


AST  8 U/L (13-39)  L  17  05:30    


 


ALT  7 U/L (7-52)   17  05:30    


 


Alkaline Phosphatase  77 U/L ()   17  05:30    


 


Troponin I  0.02 ng/mL (0.01-0.05)   17  13:35    


 


B-Natriuretic Peptide  551.0 pg/mL (5.0-100.0)  H  17  21:33    


 


Total Protein  6.3 gm/dL (6.0-8.3)   17  05:30    


 


Albumin  3.0 gm/dL (3.7-5.3)  L  17  05:30    


 


Globulin  3.3 gm/dL  17  05:30    


 


Albumin/Globulin Ratio  0.9  (1.0-1.8)  L  17  05:30    


 


Triglycerides  195 mg/dL (<150)  H  17  05:15    


 


Cholesterol  139 mg/dL (<200)   17  05:15    


 


LDL Cholesterol Direct  75 mg/dL ()   17  05:15    


 


HDL Cholesterol  35 mg/dL (23-92)   17  05:15    


 


TSH  0.37 uIU/ml (0.34-5.60)   17  05:30    


 


Urine Source  CLEAN C   17  10:24    


 


Urine Color  YELLOW   17  10:24    


 


Urine Clarity  CLEAR  (CLEAR)   17  10:24    


 


Urine pH  6.0  (4.6 - 8.0)   17  10:24    


 


Ur Specific Gravity  1.015  (1.005-1.030)   17  10:24    


 


Urine Protein  >=300 mg/dL (NEGATIVE)   17  10:24    


 


Urine Glucose (UA)  >=1000 mg/dL (NEGATIVE)  H  17  10:24    


 


Urine Ketones  NEGATIVE mg/dL (NEGATIVE)   17  10:24    


 


Urine Blood  MODERATE  (NEGATIVE)  H  17  10:24    


 


Urine Nitrate  NEGATIVE  (NEGATIVE)   17  10:24    


 


Urine Bilirubin  NEGATIVE  (NEGATIVE)   17  10:24    


 


Urine Urobilinogen  0.2 E.U./dL (0.2 - 1.0)   17  10:24    


 


Ur Leukocyte Esterase  NEGATIVE  (NEGATIVE)   17  10:24    


 


Urine RBC  5-10 /hpf (0-5)  H  17  10:24    


 


Urine WBC  0-2 /hpf (0-5)   17  10:24    


 


Ur Epithelial Cells  FEW /lpf (FEW)   17  10:24    


 


Urine Bacteria  1+ /hpf (NONE SEEN)  H  17  10:24    


 


U Random Total Protein  548.0 mg/dL  17  12:00    


 


Urine Collection Time  24 hours  17  12:00    


 


Urine Total Volume  3350 ml  17  12:00    


 


Urine Creatinine  32.0 mg/dl (28.0-217.0)   17  12:00    


 


Creat Clearance 24 Hr  28 ml/min (88..00)  L  17  12:00    


 


U Tot Protein 24h, Calc  22736.0 mg/24 hr (0-165)  H  17  12:00    


 


Body Surface Area  2.10   17  12:00    


 


Influenza A (Rapid)  NEG FOR INF A   17  23:25    


 


Influenza B (Rapid)  NEG FOR INF B   17  23:25    














- Physical Exam


Vitals and I&O: 


 Vital Signs











Temp  96.4 F   18 12:00


 


Pulse  62   18 12:00


 


Resp  18   18 12:00


 


BP  122/59   18 12:00


 


Pulse Ox  96   18 12:00








 Intake & Output











 18





 18:59 06:59 18:59


 


Intake Total  900 


 


Balance  900 


 


Weight (lbs)  110.903 kg 


 


Intake:   


 


  Intake, IV Amount  50 


 


    cefTRIAXone 1 gm In  50 





    Sodium Chloride 0.9% 50   





    ml @ 100 mls/hr IV Q24HR   





    [facility] Rx#:[id number]   


 


  Oral  850 


 


Other:   


 


  # Voids  5 


 


  # Bowel Movements  0 











Active Medications: 


Current Medications





Albuterol Sulfate (Albuterol 2.5mg/3ml Neb Ud)  2.5 mg HHN [id number] [facility]


   Stop: 18 06:59


   Last Admin: 18 11:15 Dose:  2.5 mg


Albuterol/Ipratropium (Duoneb Neb)  3 ml HHN Q2HR PRN


   PRN Reason: Shortness of Breath


   Stop: 18 02:21


   Last Admin: 17 02:56 Dose:  3 ml


Albuterol/Ipratropium (Duoneb Neb)  3 ml HHN [id number] [facility]


   Stop: 18 06:59


   Last Admin: 18 18:43 Dose:  3 ml


Aspirin (Aspirin Chewable)  81 mg PO DAILY [facility]


   Stop: 18 18:14


   Last Admin: 18 09:27 Dose:  81 mg


Budesonide (Pulmicort)  0.5 mg HHN BIDRT [facility]


   Stop: 18 06:59


   Last Admin: 18 07:06 Dose:  0.5 mg


Carvedilol (Coreg)  18.75 mg PO BID [facility]


   Stop: 18 09:59


   Last Admin: 18 09:26 Dose:  18.75 mg


Clopidogrel Bisulfate (Plavix)  75 mg PO DAILY [facility]


   Stop: 18 08:59


   Last Admin: 18 09:28 Dose:  75 mg


Ceftriaxone Sodium 1 gm/ (Sodium Chloride)  50 mls @ 100 mls/hr IV Q24HR [facility]


   Stop: 18 20:59


   Last Infusion: 18 21:40 Dose:  Infused


Insulin Aspart (Novolog Insulin Sliding Scale)  0 units SUBQ ACHS [facility]


   PRN Reason: Protocol


   Stop: 18 07:29


   Last Admin: 18 11:13 Dose:  4 units


Insulin Detemir (Levemir Insulin)  25 units SUBQ HS [facility]


   PRN Reason: Protocol


   Stop: 18 08:37


   Last Admin: 18 20:24 Dose:  25 unit


Ipratropium Bromide (Atrovent Neb 0.5mg/2.5ml)  0.5 mg HHN [id number] [facility]


   Stop: 18 06:59


   Last Admin: 18 11:15 Dose:  0.5 mg


Losartan Potassium (Cozaar)  100 mg PO DAILY [facility]


   Stop: 18 08:59


   Last Admin: 18 09:27 Dose:  100 mg


Miscellaneous (Probiotic Screen)  1 ea [facility] PRN PRN


   PRN Reason: PROTOCOL


   Stop: 18 16:51


Miscellaneous (Clinical Monitoring)  1 ea [facility] DAILY PRN


   PRN Reason: RENAL


   Stop: 18 09:24


Oseltamivir Phosphate (Tamiflu)  75 mg PO BID [facility]


   Stop: 18 22:59


   Last Admin: 18 09:20 Dose:  Not Given


Simvastatin (Zocor)  40 mg PO DAILY [facility]


   PRN Reason: Protocol


   Stop: 18 20:59


   Last Admin: 18 09:27 Dose:  40 mg








Cardiovascular: Regular rate


Lungs: Clear to auscultation


Extremities: no Edema





Assessment/Plan





- Assessment


Assessment: 





CHF


COPD exacerbation


DM with hyperglycemia


DM with Nephropathy


HTN





- Plan


Plan: 


 


Follow up chest xray


Continue current treatment


DC plan for home later today


DC plan discussed with patient family and nursing staff





Nutritional Asmnt/Malnutr-PDOC





- Dietary Evaluation


Malnutrition Findings (Please click <Entered> for more info): 








Nutritional Asmnt/Malnutrition                             Start:  17 16:

46


Text:                                                      Status: Active      

  


Freq:                                                                          

  


 Document     17 16:47  YASMIN  (Rec: 17 17:02  PILARG  [id number])


 Nutritional Asmnt/Malnutrition


     Patient General Information


      Nutritional Screening                      High Risk


                                                 Consult


      Diagnosis                                  COPD exacerbation


      Pertinent Medical Hx/Surgical Hx           HTN, diabetic neuropathy,


                                                 legally blind, DM, coronary


                                                 stents


      Subjective Information                     Consult received for


                                                 hyperglycemia. Pt seen lying


                                                 in bed, awake and alert during


                                                 the time of visit. Pt


                                                 reported good appetite,


                                                 consumed most of breakfast


                                                 except banana. Pt likes


                                                 vegetables, follow diabetic


                                                 diet at home, avoid sugar food


                                                 . Pt reported 18lb wt loss d/t


                                                 hospitalization and eating


                                                 diabetic diet at hospital, and


                                                 then gained back 6lb d/t


                                                 holiday. Pt showed interets of


                                                 more information about


                                                 diabetic diet, asking for


                                                 brochures. Pt appeared no fat/


                                                 muscle wasting.


      Current Diet Order/ Nutrition Support      [facility] 45gm, 1800 ADA


      Pertinent Medications                      novolog


      Pertinent Labs                              na 133, K 4.2, Cl 103,


                                                 BUN 34, Cr 2.4, Glucose 295,


                                                 A1C 7.7,


                                                  -472


     Nutritional Hx/Data


      Height                                     1.65 m


      Height (Calculated Centimeters)            165.1


      Current Weight (lbs)                       104.326 kg


      Weight (Calculated Kilograms)              104.3


      Weight (Calculated Grams)                  306042.2


      Ideal Body Weight                          125


      % Ideal Body Weight                        184


      Body Mass Index (BMI)                      38.2


      Weight Status                              Obese


     GI Symptoms


      GI Symptoms                                None


      Last BM                                    no records


      Difficult in:                              None


      Usual diet at home                         avoid sugar food, likes


                                                 vegetabls


      Skin Integrity/Comment:                    Patient noted to both elbow


                                                 with scaly,dry patches rash.


     Estimated Nutritional Goals


      BEE in Kcals:                              Adj wt of IBW


      Calories/Kcals/Kg                          25-30


      Kcals Calculated                           9197-1518


      Protein:                                   Adj wt of IBW


      Protein g/k-1.2


      Protein Calculated                         69-83


      Fluid: ml                                  1155-8165


     Nutritional Problem


      2. Problem


       Problem                                   overweight


       Etiology                                  possible excessive energy


                                                 intake


       Signs/Symptoms:                           BMI 38.3


      1. Problem


       Problem                                   altered nutrition related lab


                                                 values


       Etiology                                  hx of DM


       Signs/Symptoms:                           Glucose 295, A1C 7.7, -


                                                 472


     Malnutrition Alert


      Protein-Calorie Malnutrition               N/A


      Is there a minimum of two criteria         No


       selected?                                 


       Query Text:Check all the applicable       


       criteria. A minimum of two criteria are   


       recommended for diagnosis of either       


       severe or non-severe malnutrition.        


     Intervention/Recommendation


      Comments                                   1. Continue with current diet


                                                 as ordered. Provided diabetes


                                                 education and education


                                                 materials, pt showed interests


                                                 to read it.


                                                 2. Monitor PO intake, wt, labs


                                                 and skin integrity


                                                 3. F/U as moderate risk in 3-5


                                                 days, -1/3


     Expected Outcomes/Goals


      Expected Outcomes/Goals                    1. PO intake to meet at least


                                                 75% of nutritional needs.


                                                 2. Wt stability, skin to


                                                 remain intact, labs to


                                                 approach WNL.

## 2018-01-02 NOTE — GENERAL PROGRESS NOTE
Subjective





- Review of Systems


Service Date: 18


Subjective: 





alert, feels better, wants to go home





Objective





- Results


Result Diagrams: 


 18 05:50





 18 05:50


Recent Labs: 


 Laboratory Last Values











WBC  9.2 Th/cmm (4.8-10.8)   18  05:50    


 


RBC  3.72 Mil/cmm (3.80-5.20)  L  18  05:50    


 


Hgb  10.9 gm/dL (12-16)  L  18  05:50    


 


Hct  31.6 % (41.0-60)  L  18  05:50    


 


MCV  85.0 fl ()   18  05:50    


 


MCH  29.3 pg (27.0-31.0)   18  05:50    


 


MCHC Differential  34.4 pg (28.0-36.0)   18  05:50    


 


RDW  13.5 % (11.5-20.0)   18  05:50    


 


Plt Count  264 Th/cmm (150-400)   18  05:50    


 


MPV  9.0 fl  18  05:50    


 


Neutrophils %  74.0 % (40.0-80.0)   18  05:50    


 


Lymphocytes %  18.8 % (20.0-50.0)  L  18  05:50    


 


Monocytes %  5.9 % (2.0-10.0)   18  05:50    


 


Eosinophils %  0.8 % (0.0-5.0)   18  05:50    


 


Basophils %  0.5 % (0.0-2.0)   18  05:50    


 


Specimen Source  Arterial   17  13:45    


 


Sample Site  Right Radial   17  13:45    


 


pH  7.34  (7.35-7.45)  L  17  13:45    


 


pCO2  40.0 mmHg (35.0-45.0)   17  13:45    


 


pO2  72.0 mmHg (80.0-100.0)  L  17  13:45    


 


HCO3  21.8 mEq/L (20.0-26.0)   17  13:45    


 


Base Excess  -3.9 mEq/L (-3.0-3.0)  L  17  13:45    


 


O2 Saturation  93.0 % (92.0-100.0)   17  13:45    


 


Lan Test  Positive   17  13:45    


 


Vent Rate  NA   17  13:45    


 


Inspired O2  21   17  13:45    


 


Tidal Volume  NA   17  13:45    


 


PEEP  NA   17  13:45    


 


Pressure (ins/psv/peep)  NA   17  13:45    


 


Critical Value  ELENA ACUNAO   17  13:45    


 


Sodium  136 mEq/L (136-145)   18  05:50    


 


Potassium  3.6 mEq/L (3.5-5.1)   18  05:50    


 


Chloride  103 mEq/L ()   18  05:50    


 


Carbon Dioxide  26.5 mEq/L (21.0-31.0)   18  05:50    


 


Anion Gap  10.1  (7.0-16.0)   18  05:50    


 


BUN  64 mg/dL (7-25)  H  18  05:50    


 


Creatinine  2.3 mg/dL (0.6-1.2)  H  18  05:50    


 


Est GFR ( Amer)  [facility]   18  05:50    


 


Est GFR (Non-Af Amer)  [facility]   18  05:50    


 


BUN/Creatinine Ratio  27.8   18  05:50    


 


Glucose  425 mg/dL ()  H D 18  20:46    


 


POC Glucose  231 MG/DL (70 - 105)  H  18  10:50    


 


Hemoglobin A1c %  7.7 % (4.0-6.0)  H  17  21:33    


 


Calcium  8.1 mg/dL (8.6-10.3)  L  18  05:50    


 


Total Bilirubin  0.3 mg/dL (0.3-1.0)   17  05:30    


 


Direct Bilirubin  0.06 mg/dL (0.0-0.2)   17  05:30    


 


AST  8 U/L (13-39)  L  17  05:30    


 


ALT  7 U/L (7-52)   17  05:30    


 


Alkaline Phosphatase  77 U/L ()   17  05:30    


 


Troponin I  0.02 ng/mL (0.01-0.05)   17  13:35    


 


B-Natriuretic Peptide  551.0 pg/mL (5.0-100.0)  H  17  21:33    


 


Total Protein  6.3 gm/dL (6.0-8.3)   17  05:30    


 


Albumin  3.0 gm/dL (3.7-5.3)  L  17  05:30    


 


Globulin  3.3 gm/dL  17  05:30    


 


Albumin/Globulin Ratio  0.9  (1.0-1.8)  L  17  05:30    


 


Triglycerides  195 mg/dL (<150)  H  17  05:15    


 


Cholesterol  139 mg/dL (<200)   17  05:15    


 


LDL Cholesterol Direct  75 mg/dL ()   17  05:15    


 


HDL Cholesterol  35 mg/dL (23-92)   17  05:15    


 


TSH  0.37 uIU/ml (0.34-5.60)   17  05:30    


 


Urine Source  CLEAN C   17  10:24    


 


Urine Color  YELLOW   17  10:24    


 


Urine Clarity  CLEAR  (CLEAR)   17  10:24    


 


Urine pH  6.0  (4.6 - 8.0)   17  10:24    


 


Ur Specific Gravity  1.015  (1.005-1.030)   17  10:24    


 


Urine Protein  >=300 mg/dL (NEGATIVE)   17  10:24    


 


Urine Glucose (UA)  >=1000 mg/dL (NEGATIVE)  H  17  10:24    


 


Urine Ketones  NEGATIVE mg/dL (NEGATIVE)   17  10:24    


 


Urine Blood  MODERATE  (NEGATIVE)  H  17  10:24    


 


Urine Nitrate  NEGATIVE  (NEGATIVE)   17  10:24    


 


Urine Bilirubin  NEGATIVE  (NEGATIVE)   17  10:24    


 


Urine Urobilinogen  0.2 E.U./dL (0.2 - 1.0)   17  10:24    


 


Ur Leukocyte Esterase  NEGATIVE  (NEGATIVE)   17  10:24    


 


Urine RBC  5-10 /hpf (0-5)  H  17  10:24    


 


Urine WBC  0-2 /hpf (0-5)   17  10:24    


 


Ur Epithelial Cells  FEW /lpf (FEW)   17  10:24    


 


Urine Bacteria  1+ /hpf (NONE SEEN)  H  17  10:24    


 


U Random Total Protein  548.0 mg/dL  17  12:00    


 


Urine Collection Time  24 hours  17  12:00    


 


Urine Total Volume  3350 ml  17  12:00    


 


Urine Creatinine  32.0 mg/dl (28.0-217.0)   17  12:00    


 


Creat Clearance 24 Hr  28 ml/min (88..00)  L  17  12:00    


 


U Tot Protein 24h, Calc  58672.0 mg/24 hr (0-165)  H  17  12:00    


 


Body Surface Area  2.10   17  12:00    


 


Influenza A (Rapid)  NEG FOR INF A   17  23:25    


 


Influenza B (Rapid)  NEG FOR INF B   17  23:25    














- Physical Exam


Vitals and I&O: 


 Vital Signs











Temp  96.4 F   18 12:00


 


Pulse  75   18 14:45


 


Resp  20   18 14:45


 


BP  122/59   18 12:00


 


Pulse Ox  96   18 14:45








 Intake & Output











 18





 18:59 06:59 18:59


 


Intake Total  900 


 


Balance  900 


 


Weight (lbs)  110.903 kg 


 


Intake:   


 


  Intake, IV Amount  50 


 


    cefTRIAXone 1 gm In  50 





    Sodium Chloride 0.9% 50   





    ml @ 100 mls/hr IV Q24HR   





    [facility] Rx#:[id number]   


 


  Oral  850 


 


Other:   


 


  # Voids  5 


 


  # Bowel Movements  0 











Active Medications: 


Current Medications





Albuterol Sulfate (Albuterol 2.5mg/3ml Neb Ud)  2.5 mg HHN [id number] [facility]


   Stop: 18 06:59


   Last Admin: 18 14:44 Dose:  2.5 mg


Albuterol/Ipratropium (Duoneb Neb)  3 ml HHN Q2HR PRN


   PRN Reason: Shortness of Breath


   Stop: 18 02:21


   Last Admin: 17 02:56 Dose:  3 ml


Albuterol/Ipratropium (Duoneb Neb)  3 ml HHN [id number] [facility]


   Stop: 18 06:59


   Last Admin: 18 18:43 Dose:  3 ml


Aspirin (Aspirin Chewable)  81 mg PO DAILY [facility]


   Stop: 18 18:14


   Last Admin: 18 09:27 Dose:  81 mg


Budesonide (Pulmicort)  0.5 mg HHN BIDRT [facility]


   Stop: 18 06:59


   Last Admin: 18 07:06 Dose:  0.5 mg


Carvedilol (Coreg)  18.75 mg PO BID [facility]


   Stop: 18 09:59


   Last Admin: 18 09:26 Dose:  18.75 mg


Clopidogrel Bisulfate (Plavix)  75 mg PO DAILY [facility]


   Stop: 18 08:59


   Last Admin: 18 09:28 Dose:  75 mg


Ceftriaxone Sodium 1 gm/ (Sodium Chloride)  50 mls @ 100 mls/hr IV Q24HR [facility]


   Stop: 18 20:59


   Last Infusion: 18 21:40 Dose:  Infused


Insulin Aspart (Novolog Insulin Sliding Scale)  0 units SUBQ ACHS [facility]


   PRN Reason: Protocol


   Stop: 18 07:29


   Last Admin: 18 11:13 Dose:  4 units


Insulin Detemir (Levemir Insulin)  25 units SUBQ HS [facility]


   PRN Reason: Protocol


   Stop: 18 08:37


   Last Admin: 18 20:24 Dose:  25 unit


Ipratropium Bromide (Atrovent Neb 0.5mg/2.5ml)  0.5 mg HHN [id number] [facility]


   Stop: 18 06:59


   Last Admin: 18 14:44 Dose:  0.5 mg


Losartan Potassium (Cozaar)  100 mg PO DAILY [facility]


   Stop: 18 08:59


   Last Admin: 18 09:27 Dose:  100 mg


Miscellaneous (Probiotic Screen)  1 ea MC PRN PRN


   PRN Reason: PROTOCOL


   Stop: 18 16:51


Miscellaneous (Clinical Monitoring)  1 ea MC DAILY PRN


   PRN Reason: RENAL


   Stop: 18 09:24


Oseltamivir Phosphate (Tamiflu)  75 mg PO BID [facility]


   Stop: 18 22:59


   Last Admin: 18 09:20 Dose:  Not Given


Simvastatin (Zocor)  40 mg PO DAILY [facility]


   PRN Reason: Protocol


   Stop: 18 20:59


   Last Admin: 18 09:27 Dose:  40 mg








General: Alert, Oriented x3, No acute distress


HEENT: Atraumatic, EOMI, Mucous membr. moist/pink


Neck: Supple, +2 carotid pulse wo bruit


Cardiovascular: Regular rate, Normal S1, Normal S2


Lungs: Other (scattered rhonchi)


Abdomen: Bowel sounds, Soft


Extremities: no Edema


Neurological: Sensation intact


Skin: no Rash





Assessment/Plan





- Assessment


Assessment: 





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


Exacer of COPD


CAD S/P Cath


Type 2 DM


Ess Htn


Left CAP


Obesity


Dyslipidemia





- Plan


Plan: 


Lab - Result Diagrams





 18 05:50 





 18 05:50 





Current Medications





Albuterol Sulfate (Albuterol 2.5mg/3ml Neb Ud)  2.5 mg HHN [id number] [facility]


   Stop: 18 06:59


   Last Admin: 18 14:44 Dose:  2.5 mg


Albuterol/Ipratropium (Duoneb Neb)  3 ml HHN Q2HR PRN


   PRN Reason: Shortness of Breath


   Stop: 18 02:21


   Last Admin: 17 02:56 Dose:  3 ml


Albuterol/Ipratropium (Duoneb Neb)  3 ml HHN [id number] [facility]


   Stop: 18 06:59


   Last Admin: 18 18:43 Dose:  3 ml


Aspirin (Aspirin Chewable)  81 mg PO DAILY [facility]


   Stop: 18 18:14


   Last Admin: 18 09:27 Dose:  81 mg


Budesonide (Pulmicort)  0.5 mg HHN BIDRT [facility]


   Stop: 18 06:59


   Last Admin: 18 07:06 Dose:  0.5 mg


Carvedilol (Coreg)  18.75 mg PO BID [facility]


   Stop: 18 09:59


   Last Admin: 18 09:26 Dose:  18.75 mg


Clopidogrel Bisulfate (Plavix)  75 mg PO DAILY [facility]


   Stop: 18 08:59


   Last Admin: 18 09:28 Dose:  75 mg


Ceftriaxone Sodium 1 gm/ (Sodium Chloride)  50 mls @ 100 mls/hr IV Q24HR [facility]


   Stop: 18 20:59


   Last Infusion: 18 21:40 Dose:  Infused


Insulin Aspart (Novolog Insulin Sliding Scale)  0 units SUBQ ACHS [facility]


   PRN Reason: Protocol


   Stop: 18 07:29


   Last Admin: 18 11:13 Dose:  4 units


Insulin Detemir (Levemir Insulin)  25 units SUBQ HS [facility]


   PRN Reason: Protocol


   Stop: 18 08:37


   Last Admin: 18 20:24 Dose:  25 unit


Ipratropium Bromide (Atrovent Neb 0.5mg/2.5ml)  0.5 mg HHN [id number] [facility]


   Stop: 18 06:59


   Last Admin: 18 14:44 Dose:  0.5 mg


Losartan Potassium (Cozaar)  100 mg PO DAILY [facility]


   Stop: 18 08:59


   Last Admin: 18 09:27 Dose:  100 mg


Miscellaneous (Probiotic Screen)  1 ea MC PRN PRN


   PRN Reason: PROTOCOL


   Stop: 18 16:51


Miscellaneous (Clinical Monitoring)  1 ea MC DAILY PRN


   PRN Reason: RENAL


   Stop: 18 09:24


Oseltamivir Phosphate (Tamiflu)  75 mg PO BID [facility]


   Stop: 18 22:59


   Last Admin: 18 09:20 Dose:  Not Given


Simvastatin (Zocor)  40 mg PO DAILY [facility]


   PRN Reason: Protocol


   Stop: 18 20:59


   Last Admin: 18 09:27 Dose:  40 mg





Kidney fnc stable


known pt for years


f/u as scheduled





Nutritional Asmnt/Malnutr-PDOC





- Dietary Evaluation


Malnutrition Findings (Please click <Entered> for more info): 








Nutritional Asmnt/Malnutrition                             Start:  17 16:

46


Text:                                                      Status: Active      

  


Freq:                                                                          

  


 Document     17 16:47  LCSOLO  (Rec: 17 17:02  LCHENG  [id number])


 Nutritional Asmnt/Malnutrition


     Patient General Information


      Nutritional Screening                      High Risk


                                                 Consult


      Diagnosis                                  COPD exacerbation


      Pertinent Medical Hx/Surgical Hx           HTN, diabetic neuropathy,


                                                 legally blind, DM, coronary


                                                 stents


      Subjective Information                     Consult received for


                                                 hyperglycemia. Pt seen lying


                                                 in bed, awake and alert during


                                                 the time of visit. Pt


                                                 reported good appetite,


                                                 consumed most of breakfast


                                                 except banana. Pt likes


                                                 vegetables, follow diabetic


                                                 diet at home, avoid sugar food


                                                 . Pt reported 18lb wt loss d/t


                                                 hospitalization and eating


                                                 diabetic diet at hospital, and


                                                 then gained back 6lb d/t


                                                 holiday. Pt showed interets of


                                                 more information about


                                                 diabetic diet, asking for


                                                 brochures. Pt appeared no fat/


                                                 muscle wasting.


      Current Diet Order/ Nutrition Support      CCHO 45gm, 1800 ADA


      Pertinent Medications                      novolog


      Pertinent Labs                              na 133, K 4.2, Cl 103,


                                                 BUN 34, Cr 2.4, Glucose 295,


                                                 A1C 7.7,


                                                  -472


     Nutritional Hx/Data


      Height                                     1.65 m


      Height (Calculated Centimeters)            165.1


      Current Weight (lbs)                       104.326 kg


      Weight (Calculated Kilograms)              104.3


      Weight (Calculated Grams)                  459780.2


      Ideal Body Weight                          125


      % Ideal Body Weight                        184


      Body Mass Index (BMI)                      38.2


      Weight Status                              Obese


     GI Symptoms


      GI Symptoms                                None


      Last BM                                    no records


      Difficult in:                              None


      Usual diet at home                         avoid sugar food, likes


                                                 vegetabls


      Skin Integrity/Comment:                    Patient noted to both elbow


                                                 with scaly,dry patches rash.


     Estimated Nutritional Goals


      BEE in Kcals:                              Adj wt of IBW


      Calories/Kcals/Kg                          25-30


      Kcals Calculated                           9043-6126


      Protein:                                   Adj wt of IBW


      Protein g/k-1.2


      Protein Calculated                         69-83


      Fluid: ml                                  1112-4670


     Nutritional Problem


      2. Problem


       Problem                                   overweight


       Etiology                                  possible excessive energy


                                                 intake


       Signs/Symptoms:                           BMI 38.3


      1. Problem


       Problem                                   altered nutrition related lab


                                                 values


       Etiology                                  hx of DM


       Signs/Symptoms:                           Glucose 295, A1C 7.7, -


                                                 472


     Malnutrition Alert


      Protein-Calorie Malnutrition               N/A


      Is there a minimum of two criteria         No


       selected?                                 


       Query Text:Check all the applicable       


       criteria. A minimum of two criteria are   


       recommended for diagnosis of either       


       severe or non-severe malnutrition.        


     Intervention/Recommendation


      Comments                                   1. Continue with current diet


                                                 as ordered. Provided diabetes


                                                 education and education


                                                 materials, pt showed interests


                                                 to read it.


                                                 2. Monitor PO intake, wt, labs


                                                 and skin integrity


                                                 3. F/U as moderate risk in 3-5


                                                 days, -1/3


     Expected Outcomes/Goals


      Expected Outcomes/Goals                    1. PO intake to meet at least


                                                 75% of nutritional needs.


                                                 2. Wt stability, skin to


                                                 remain intact, labs to


                                                 approach WNL.

## 2018-01-02 NOTE — DIAGNOSTIC IMAGING REPORT
Portable chest x-ray



HISTORY: Shortness of breath



Compared with the prior exam of December 28, 2017, there is persistent

cardiomegaly. Questionable faint density noted in the left lower lobe.

Pneumonia cannot be excluded. Clinical correlation is needed. A small

left pleural effusion cannot be excluded.



IMPRESSION:

1. Cardiomegaly with atherosclerotic vascular changes

2. Questionable density left lower lobe. Pneumonia cannot be excluded.

Clinical correlation needed.

## 2022-12-08 ENCOUNTER — HOSPITAL ENCOUNTER (INPATIENT)
Dept: HOSPITAL 4 - SED | Age: 77
LOS: 6 days | Discharge: HOME | DRG: 314 | End: 2022-12-14
Payer: COMMERCIAL

## 2022-12-08 VITALS — BODY MASS INDEX: 32.78 KG/M2 | HEIGHT: 64 IN | WEIGHT: 192 LBS

## 2022-12-08 VITALS — SYSTOLIC BLOOD PRESSURE: 81 MMHG

## 2022-12-08 VITALS — SYSTOLIC BLOOD PRESSURE: 114 MMHG
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
## 2022-12-08 DIAGNOSIS — E87.20: ICD-10-CM

## 2022-12-08 DIAGNOSIS — Z87.891: ICD-10-CM

## 2022-12-08 DIAGNOSIS — Z99.2: ICD-10-CM

## 2022-12-08 DIAGNOSIS — Z20.822: ICD-10-CM

## 2022-12-08 DIAGNOSIS — Z79.4: ICD-10-CM
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
## 2022-12-08 DIAGNOSIS — T82.818A: Primary | ICD-10-CM

## 2022-12-08 DIAGNOSIS — E11.621: ICD-10-CM

## 2022-12-08 DIAGNOSIS — N39.0: ICD-10-CM

## 2022-12-08 DIAGNOSIS — I12.0: ICD-10-CM

## 2022-12-08 DIAGNOSIS — E11.319: ICD-10-CM

## 2022-12-08 DIAGNOSIS — I82.C13: ICD-10-CM

## 2022-12-08 DIAGNOSIS — E11.22: ICD-10-CM

## 2022-12-08 DIAGNOSIS — Z87.440: ICD-10-CM

## 2022-12-08 DIAGNOSIS — A41.9: ICD-10-CM

## 2022-12-08 DIAGNOSIS — B95.2: ICD-10-CM

## 2022-12-08 DIAGNOSIS — N17.0: ICD-10-CM

## 2022-12-08 DIAGNOSIS — N18.6: ICD-10-CM

## 2022-12-08 DIAGNOSIS — Z16.21: ICD-10-CM

## 2022-12-08 DIAGNOSIS — J81.1: ICD-10-CM

## 2022-12-08 DIAGNOSIS — J18.9: ICD-10-CM

## 2022-12-08 DIAGNOSIS — L97.529: ICD-10-CM

## 2022-12-08 DIAGNOSIS — D63.8: ICD-10-CM

## 2022-12-08 DIAGNOSIS — I82.413: ICD-10-CM

## 2022-12-08 DIAGNOSIS — E44.0: ICD-10-CM

## 2022-12-08 DIAGNOSIS — I25.10: ICD-10-CM

## 2022-12-08 LAB
ALBUMIN SERPL BCP-MCNC: 2.6 G/DL (ref 3.4–4.8)
ALT SERPL W P-5'-P-CCNC: 5 U/L (ref 12–78)
ANION GAP SERPL CALCULATED.3IONS-SCNC: 7 MMOL/L (ref 5–15)
APPEARANCE UR: (no result)
AST SERPL W P-5'-P-CCNC: 13 U/L (ref 10–37)
BACTERIA URNS QL MICRO: (no result) /HPF
BASOPHILS # BLD AUTO: 0 K/UL (ref 0–0.2)
BASOPHILS NFR BLD AUTO: 0.7 % (ref 0–2)
BILIRUB SERPL-MCNC: 0.4 MG/DL (ref 0–1)
BILIRUB UR QL STRIP: NEGATIVE
BUN SERPL-MCNC: 35 MG/DL (ref 8–21)
CALCIUM SERPL-MCNC: 8.5 MG/DL (ref 8.4–11)
CHLORIDE SERPL-SCNC: 97 MMOL/L (ref 98–107)
COLOR UR: YELLOW
CREAT SERPL-MCNC: 4.86 MG/DL (ref 0.55–1.3)
EOSINOPHIL # BLD AUTO: 0.2 K/UL (ref 0–0.4)
EOSINOPHIL NFR BLD AUTO: 2.6 % (ref 0–4)
ERYTHROCYTE [DISTWIDTH] IN BLOOD BY AUTOMATED COUNT: 16.7 % (ref 9–15)
GFR SERPL CREATININE-BSD FRML MDRD: (no result) ML/MIN (ref 90–?)
GLUCOSE SERPL-MCNC: 73 MG/DL (ref 70–99)
GLUCOSE UR STRIP-MCNC: NEGATIVE MG/DL
HCT VFR BLD AUTO: 33.9 % (ref 36–48)
HGB BLD-MCNC: 11.1 G/DL (ref 12–16)
HGB UR QL STRIP: (no result)
KETONES UR STRIP-MCNC: (no result) MG/DL
LEUKOCYTE ESTERASE UR QL STRIP: (no result)
LYMPHOCYTES # BLD AUTO: 1.2 K/UL (ref 1–5.5)
LYMPHOCYTES NFR BLD AUTO: 19.1 % (ref 20.5–51.5)
MCH RBC QN AUTO: 32 PG (ref 27–31)
MCHC RBC AUTO-ENTMCNC: 33 % (ref 32–36)
MCV RBC AUTO: 99 FL (ref 79–98)
MONOCYTES # BLD MANUAL: 0.6 K/UL (ref 0–1)
MONOCYTES # BLD MANUAL: 9.6 % (ref 1.7–9.3)
NEUTROPHILS # BLD AUTO: 4.4 K/UL (ref 1.8–7.7)
NEUTROPHILS NFR BLD AUTO: 68 % (ref 40–70)
NITRITE UR QL STRIP: NEGATIVE
PH UR STRIP: 6 [PH] (ref 5–8)
PHOSPHATE SERPL-MCNC: 3.1 MG/DL (ref 2.7–4.5)
PLATELET # BLD AUTO: 183 K/UL (ref 130–430)
PROT UR QL STRIP: (no result)
RBC # BLD AUTO: 3.43 MIL/UL (ref 4.2–6.2)
RBC #/AREA URNS HPF: (no result) /HPF (ref 0–3)
SP GR UR STRIP: 1.02 (ref 1–1.03)
UROBILINOGEN UR STRIP-MCNC: 0.2 MG/DL (ref 0.2–1)
WBC # BLD AUTO: 6.4 K/UL (ref 4.8–10.8)
WBC #/AREA URNS HPF: >100 /HPF (ref 0–3)

## 2022-12-08 PROCEDURE — G0378 HOSPITAL OBSERVATION PER HR: HCPCS

## 2022-12-08 PROCEDURE — A6209 FOAM DRSG <=16 SQ IN W/O BDR: HCPCS

## 2022-12-08 RX ADMIN — HEPARIN SODIUM SCH UNITS: 5000 INJECTION, SOLUTION INTRAVENOUS; SUBCUTANEOUS at 21:55

## 2022-12-08 RX ADMIN — CEFTRIAXONE SODIUM SCH MLS/HR: 1 INJECTION, SOLUTION INTRAVENOUS at 21:45

## 2022-12-09 VITALS — SYSTOLIC BLOOD PRESSURE: 142 MMHG

## 2022-12-09 VITALS — SYSTOLIC BLOOD PRESSURE: 127 MMHG

## 2022-12-09 VITALS — SYSTOLIC BLOOD PRESSURE: 105 MMHG

## 2022-12-09 VITALS — SYSTOLIC BLOOD PRESSURE: 111 MMHG

## 2022-12-09 LAB
ANION GAP SERPL CALCULATED.3IONS-SCNC: 8 MMOL/L (ref 5–15)
BASOPHILS # BLD AUTO: 0 K/UL (ref 0–0.2)
BASOPHILS NFR BLD AUTO: 0.5 % (ref 0–2)
BUN SERPL-MCNC: 40 MG/DL (ref 8–21)
CALCIUM SERPL-MCNC: 8.3 MG/DL (ref 8.4–11)
CHLORIDE SERPL-SCNC: 95 MMOL/L (ref 98–107)
CREAT SERPL-MCNC: 5.39 MG/DL (ref 0.55–1.3)
EOSINOPHIL # BLD AUTO: 0.2 K/UL (ref 0–0.4)
EOSINOPHIL NFR BLD AUTO: 3.4 % (ref 0–4)
ERYTHROCYTE [DISTWIDTH] IN BLOOD BY AUTOMATED COUNT: 16.7 % (ref 9–15)
GFR SERPL CREATININE-BSD FRML MDRD: (no result) ML/MIN (ref 90–?)
GLUCOSE SERPL-MCNC: 121 MG/DL (ref 70–99)
HCT VFR BLD AUTO: 35.9 % (ref 36–48)
HGB BLD-MCNC: 12 G/DL (ref 12–16)
LYMPHOCYTES # BLD AUTO: 1.1 K/UL (ref 1–5.5)
LYMPHOCYTES NFR BLD AUTO: 18.1 % (ref 20.5–51.5)
MCH RBC QN AUTO: 33 PG (ref 27–31)
MCHC RBC AUTO-ENTMCNC: 33 % (ref 32–36)
MCV RBC AUTO: 99 FL (ref 79–98)
MONOCYTES # BLD MANUAL: 0.5 K/UL (ref 0–1)
MONOCYTES # BLD MANUAL: 8.9 % (ref 1.7–9.3)
NEUTROPHILS # BLD AUTO: 4.1 K/UL (ref 1.8–7.7)
NEUTROPHILS NFR BLD AUTO: 69.1 % (ref 40–70)
PLATELET # BLD AUTO: 169 K/UL (ref 130–430)
RBC # BLD AUTO: 3.64 MIL/UL (ref 4.2–6.2)
WBC # BLD AUTO: 6 K/UL (ref 4.8–10.8)

## 2022-12-09 PROCEDURE — 06JYXZZ INSPECTION OF LOWER VEIN, EXTERNAL APPROACH: ICD-10-PCS | Performed by: SURGERY

## 2022-12-09 PROCEDURE — 5A1D70Z PERFORMANCE OF URINARY FILTRATION, INTERMITTENT, LESS THAN 6 HOURS PER DAY: ICD-10-PCS

## 2022-12-09 RX ADMIN — CASTOR OIL AND BALSAM, PERU SCH GM: 788; 87 OINTMENT TOPICAL at 18:55

## 2022-12-09 RX ADMIN — HEPARIN SODIUM SCH UNITS: 5000 INJECTION, SOLUTION INTRAVENOUS; SUBCUTANEOUS at 12:22

## 2022-12-09 RX ADMIN — HEPARIN SODIUM SCH UNITS: 5000 INJECTION, SOLUTION INTRAVENOUS; SUBCUTANEOUS at 21:00

## 2022-12-10 VITALS — SYSTOLIC BLOOD PRESSURE: 129 MMHG

## 2022-12-10 VITALS — SYSTOLIC BLOOD PRESSURE: 106 MMHG

## 2022-12-10 VITALS — SYSTOLIC BLOOD PRESSURE: 124 MMHG

## 2022-12-10 VITALS — SYSTOLIC BLOOD PRESSURE: 115 MMHG

## 2022-12-10 LAB
ALBUMIN SERPL BCP-MCNC: 2.7 G/DL (ref 3.4–4.8)
ALT SERPL W P-5'-P-CCNC: 6 U/L (ref 12–78)
ANION GAP SERPL CALCULATED.3IONS-SCNC: 11 MMOL/L (ref 5–15)
AST SERPL W P-5'-P-CCNC: 14 U/L (ref 10–37)
BASOPHILS # BLD AUTO: 0 K/UL (ref 0–0.2)
BASOPHILS NFR BLD AUTO: 0.5 % (ref 0–2)
BILIRUB SERPL-MCNC: 0.4 MG/DL (ref 0–1)
BUN SERPL-MCNC: 46 MG/DL (ref 8–21)
CALCIUM SERPL-MCNC: 7.8 MG/DL (ref 8.4–11)
CHLORIDE SERPL-SCNC: 96 MMOL/L (ref 98–107)
CHOLEST SERPL-MCNC: 95 MG/DL (ref ?–200)
CREAT SERPL-MCNC: 6.19 MG/DL (ref 0.55–1.3)
EOSINOPHIL # BLD AUTO: 0.1 K/UL (ref 0–0.4)
EOSINOPHIL NFR BLD AUTO: 1.9 % (ref 0–4)
ERYTHROCYTE [DISTWIDTH] IN BLOOD BY AUTOMATED COUNT: 17 % (ref 9–15)
GFR SERPL CREATININE-BSD FRML MDRD: (no result) ML/MIN (ref 90–?)
GLUCOSE SERPL-MCNC: 201 MG/DL (ref 70–99)
HCT VFR BLD AUTO: 34.6 % (ref 36–48)
HDLC SERPL-MCNC: 41 MG/DL (ref 55–?)
HGB BLD-MCNC: 11.5 G/DL (ref 12–16)
INR PPP: 1.1 (ref 0.8–1.2)
LDL CHOLESTEROL: 39 MG/DL (ref ?–100)
LYMPHOCYTES # BLD AUTO: 1.1 K/UL (ref 1–5.5)
LYMPHOCYTES NFR BLD AUTO: 15.2 % (ref 20.5–51.5)
MCH RBC QN AUTO: 33 PG (ref 27–31)
MCHC RBC AUTO-ENTMCNC: 33 % (ref 32–36)
MCV RBC AUTO: 99 FL (ref 79–98)
MONOCYTES # BLD MANUAL: 0.5 K/UL (ref 0–1)
MONOCYTES # BLD MANUAL: 7.5 % (ref 1.7–9.3)
NEUTROPHILS # BLD AUTO: 5.2 K/UL (ref 1.8–7.7)
NEUTROPHILS NFR BLD AUTO: 74.9 % (ref 40–70)
PLATELET # BLD AUTO: 158 K/UL (ref 130–430)
PROTHROMBIN TIME: 11.5 SECS (ref 9.5–12.5)
RBC # BLD AUTO: 3.5 MIL/UL (ref 4.2–6.2)
TRIGL SERPL-MCNC: 104 MG/DL (ref 30–150)
TSH SERPL DL<=0.05 MIU/L-ACNC: 2.48 UIU/ML (ref 0.34–4.82)
WBC # BLD AUTO: 6.9 K/UL (ref 4.8–10.8)

## 2022-12-10 RX ADMIN — CARVEDILOL SCH MG: 6.25 TABLET, FILM COATED ORAL at 09:00

## 2022-12-10 RX ADMIN — CARVEDILOL SCH MG: 6.25 TABLET, FILM COATED ORAL at 22:39

## 2022-12-10 RX ADMIN — CEFTRIAXONE SODIUM SCH MLS/HR: 1 INJECTION, SOLUTION INTRAVENOUS at 00:38

## 2022-12-10 RX ADMIN — HUMAN INSULIN PRN UNITS: 100 INJECTION, SOLUTION SUBCUTANEOUS at 00:43

## 2022-12-10 RX ADMIN — Medication SCH EA: at 11:48

## 2022-12-10 RX ADMIN — Medication SCH EA: at 16:18

## 2022-12-10 RX ADMIN — HEPARIN SODIUM SCH UNITS: 5000 INJECTION, SOLUTION INTRAVENOUS; SUBCUTANEOUS at 22:47

## 2022-12-10 RX ADMIN — CASTOR OIL AND BALSAM, PERU SCH GM: 788; 87 OINTMENT TOPICAL at 09:29

## 2022-12-10 RX ADMIN — Medication SCH EA: at 22:49

## 2022-12-10 RX ADMIN — Medication SCH EA: at 06:26

## 2022-12-10 RX ADMIN — Medication SCH MG: at 09:28

## 2022-12-10 RX ADMIN — HUMAN INSULIN PRN UNITS: 100 INJECTION, SOLUTION SUBCUTANEOUS at 22:48

## 2022-12-10 RX ADMIN — HUMAN INSULIN PRN UNITS: 100 INJECTION, SOLUTION SUBCUTANEOUS at 11:50

## 2022-12-10 RX ADMIN — HUMAN INSULIN PRN UNITS: 100 INJECTION, SOLUTION SUBCUTANEOUS at 16:22

## 2022-12-10 RX ADMIN — HEPARIN SODIUM SCH UNITS: 5000 INJECTION, SOLUTION INTRAVENOUS; SUBCUTANEOUS at 09:26

## 2022-12-10 RX ADMIN — ATORVASTATIN CALCIUM SCH MG: 10 TABLET, FILM COATED ORAL at 09:30

## 2022-12-10 RX ADMIN — Medication SCH EA: at 00:40

## 2022-12-10 RX ADMIN — CARVEDILOL SCH MG: 6.25 TABLET, FILM COATED ORAL at 00:37

## 2022-12-10 RX ADMIN — HUMAN INSULIN PRN UNITS: 100 INJECTION, SOLUTION SUBCUTANEOUS at 06:29

## 2022-12-10 RX ADMIN — CEFTRIAXONE SODIUM SCH MLS/HR: 1 INJECTION, SOLUTION INTRAVENOUS at 22:39

## 2022-12-11 VITALS — SYSTOLIC BLOOD PRESSURE: 112 MMHG

## 2022-12-11 VITALS — SYSTOLIC BLOOD PRESSURE: 106 MMHG

## 2022-12-11 VITALS — SYSTOLIC BLOOD PRESSURE: 100 MMHG

## 2022-12-11 VITALS — SYSTOLIC BLOOD PRESSURE: 118 MMHG

## 2022-12-11 VITALS — SYSTOLIC BLOOD PRESSURE: 126 MMHG

## 2022-12-11 LAB
ANION GAP SERPL CALCULATED.3IONS-SCNC: 11 MMOL/L (ref 5–15)
BASOPHILS # BLD AUTO: 0 K/UL (ref 0–0.2)
BASOPHILS NFR BLD AUTO: 0.6 % (ref 0–2)
BUN SERPL-MCNC: 53 MG/DL (ref 8–21)
CALCIUM SERPL-MCNC: 8.2 MG/DL (ref 8.4–11)
CHLORIDE SERPL-SCNC: 95 MMOL/L (ref 98–107)
CREAT SERPL-MCNC: 6.86 MG/DL (ref 0.55–1.3)
EOSINOPHIL # BLD AUTO: 0.1 K/UL (ref 0–0.4)
EOSINOPHIL NFR BLD AUTO: 1.5 % (ref 0–4)
ERYTHROCYTE [DISTWIDTH] IN BLOOD BY AUTOMATED COUNT: 16.4 % (ref 9–15)
GFR SERPL CREATININE-BSD FRML MDRD: (no result) ML/MIN (ref 90–?)
GLUCOSE SERPL-MCNC: 227 MG/DL (ref 70–99)
HCT VFR BLD AUTO: 32 % (ref 36–48)
HGB BLD-MCNC: 10.8 G/DL (ref 12–16)
LYMPHOCYTES # BLD AUTO: 0.9 K/UL (ref 1–5.5)
LYMPHOCYTES NFR BLD AUTO: 14.4 % (ref 20.5–51.5)
MCH RBC QN AUTO: 33 PG (ref 27–31)
MCHC RBC AUTO-ENTMCNC: 34 % (ref 32–36)
MCV RBC AUTO: 98 FL (ref 79–98)
MONOCYTES # BLD MANUAL: 0.5 K/UL (ref 0–1)
MONOCYTES # BLD MANUAL: 7.6 % (ref 1.7–9.3)
NEUTROPHILS # BLD AUTO: 4.7 K/UL (ref 1.8–7.7)
NEUTROPHILS NFR BLD AUTO: 75.9 % (ref 40–70)
PLATELET # BLD AUTO: 150 K/UL (ref 130–430)
RBC # BLD AUTO: 3.26 MIL/UL (ref 4.2–6.2)
WBC # BLD AUTO: 6.2 K/UL (ref 4.8–10.8)

## 2022-12-11 RX ADMIN — Medication SCH EA: at 17:41

## 2022-12-11 RX ADMIN — HUMAN INSULIN PRN UNITS: 100 INJECTION, SOLUTION SUBCUTANEOUS at 07:16

## 2022-12-11 RX ADMIN — CARVEDILOL SCH MG: 6.25 TABLET, FILM COATED ORAL at 21:00

## 2022-12-11 RX ADMIN — HEPARIN SODIUM SCH UNITS: 5000 INJECTION, SOLUTION INTRAVENOUS; SUBCUTANEOUS at 22:59

## 2022-12-11 RX ADMIN — CARVEDILOL SCH MG: 6.25 TABLET, FILM COATED ORAL at 08:16

## 2022-12-11 RX ADMIN — Medication SCH MG: at 08:15

## 2022-12-11 RX ADMIN — CEFTRIAXONE SODIUM SCH MLS/HR: 1 INJECTION, SOLUTION INTRAVENOUS at 22:58

## 2022-12-11 RX ADMIN — CASTOR OIL AND BALSAM, PERU SCH GM: 788; 87 OINTMENT TOPICAL at 17:42

## 2022-12-11 RX ADMIN — ATORVASTATIN CALCIUM SCH MG: 10 TABLET, FILM COATED ORAL at 08:15

## 2022-12-11 RX ADMIN — HUMAN INSULIN PRN UNITS: 100 INJECTION, SOLUTION SUBCUTANEOUS at 11:49

## 2022-12-11 RX ADMIN — Medication SCH EA: at 07:18

## 2022-12-11 RX ADMIN — Medication SCH EA: at 11:50

## 2022-12-11 RX ADMIN — HEPARIN SODIUM SCH UNITS: 5000 INJECTION, SOLUTION INTRAVENOUS; SUBCUTANEOUS at 08:17

## 2022-12-11 RX ADMIN — Medication SCH EA: at 22:57

## 2022-12-11 RX ADMIN — CEFTRIAXONE SODIUM SCH MLS/HR: 1 INJECTION, SOLUTION INTRAVENOUS at 21:00

## 2022-12-11 RX ADMIN — HUMAN INSULIN PRN UNITS: 100 INJECTION, SOLUTION SUBCUTANEOUS at 17:40

## 2022-12-11 RX ADMIN — HUMAN INSULIN PRN UNITS: 100 INJECTION, SOLUTION SUBCUTANEOUS at 23:03

## 2022-12-12 VITALS — SYSTOLIC BLOOD PRESSURE: 139 MMHG

## 2022-12-12 VITALS — SYSTOLIC BLOOD PRESSURE: 126 MMHG

## 2022-12-12 VITALS — SYSTOLIC BLOOD PRESSURE: 141 MMHG

## 2022-12-12 VITALS — SYSTOLIC BLOOD PRESSURE: 104 MMHG

## 2022-12-12 VITALS — SYSTOLIC BLOOD PRESSURE: 131 MMHG

## 2022-12-12 RX ADMIN — HUMAN INSULIN PRN UNITS: 100 INJECTION, SOLUTION SUBCUTANEOUS at 06:40

## 2022-12-12 RX ADMIN — HUMAN INSULIN PRN UNITS: 100 INJECTION, SOLUTION SUBCUTANEOUS at 15:49

## 2022-12-12 RX ADMIN — Medication SCH EA: at 11:50

## 2022-12-12 RX ADMIN — HEPARIN SODIUM SCH UNITS: 5000 INJECTION, SOLUTION INTRAVENOUS; SUBCUTANEOUS at 09:01

## 2022-12-12 RX ADMIN — Medication SCH EA: at 21:27

## 2022-12-12 RX ADMIN — HEPARIN SODIUM SCH UNITS: 5000 INJECTION, SOLUTION INTRAVENOUS; SUBCUTANEOUS at 21:24

## 2022-12-12 RX ADMIN — HUMAN INSULIN PRN UNITS: 100 INJECTION, SOLUTION SUBCUTANEOUS at 21:26

## 2022-12-12 RX ADMIN — CASTOR OIL AND BALSAM, PERU SCH GM: 788; 87 OINTMENT TOPICAL at 09:07

## 2022-12-12 RX ADMIN — Medication SCH EA: at 06:38

## 2022-12-12 RX ADMIN — Medication SCH MG: at 08:59

## 2022-12-12 RX ADMIN — Medication SCH EA: at 15:47

## 2022-12-12 RX ADMIN — HUMAN INSULIN PRN UNITS: 100 INJECTION, SOLUTION SUBCUTANEOUS at 12:33

## 2022-12-12 RX ADMIN — CARVEDILOL SCH MG: 6.25 TABLET, FILM COATED ORAL at 09:07

## 2022-12-12 RX ADMIN — CARVEDILOL SCH MG: 6.25 TABLET, FILM COATED ORAL at 21:21

## 2022-12-12 RX ADMIN — ATORVASTATIN CALCIUM SCH MG: 10 TABLET, FILM COATED ORAL at 08:59

## 2022-12-12 RX ADMIN — LINEZOLID SCH MG: 600 TABLET, FILM COATED ORAL at 21:27

## 2022-12-13 VITALS — SYSTOLIC BLOOD PRESSURE: 135 MMHG

## 2022-12-13 VITALS — SYSTOLIC BLOOD PRESSURE: 137 MMHG

## 2022-12-13 VITALS — SYSTOLIC BLOOD PRESSURE: 131 MMHG

## 2022-12-13 VITALS — SYSTOLIC BLOOD PRESSURE: 133 MMHG

## 2022-12-13 VITALS — SYSTOLIC BLOOD PRESSURE: 102 MMHG

## 2022-12-13 LAB
ALBUMIN SERPL BCP-MCNC: 2.7 G/DL (ref 3.4–4.8)
ALT SERPL W P-5'-P-CCNC: 5 U/L (ref 12–78)
ANION GAP SERPL CALCULATED.3IONS-SCNC: 15 MMOL/L (ref 5–15)
AST SERPL W P-5'-P-CCNC: 15 U/L (ref 10–37)
BASOPHILS # BLD AUTO: 0 K/UL (ref 0–0.2)
BASOPHILS NFR BLD AUTO: 0.6 % (ref 0–2)
BILIRUB SERPL-MCNC: 0.6 MG/DL (ref 0–1)
BUN SERPL-MCNC: 68 MG/DL (ref 8–21)
CALCIUM SERPL-MCNC: 8.8 MG/DL (ref 8.4–11)
CHLORIDE SERPL-SCNC: 92 MMOL/L (ref 98–107)
CREAT SERPL-MCNC: 7.57 MG/DL (ref 0.55–1.3)
EOSINOPHIL # BLD AUTO: 0.2 K/UL (ref 0–0.4)
EOSINOPHIL NFR BLD AUTO: 3.2 % (ref 0–4)
ERYTHROCYTE [DISTWIDTH] IN BLOOD BY AUTOMATED COUNT: 16.2 % (ref 9–15)
GFR SERPL CREATININE-BSD FRML MDRD: (no result) ML/MIN (ref 90–?)
GLUCOSE SERPL-MCNC: 164 MG/DL (ref 70–99)
HCT VFR BLD AUTO: 33.9 % (ref 36–48)
HGB BLD-MCNC: 11.3 G/DL (ref 12–16)
INR PPP: 1.1 (ref 0.8–1.2)
LYMPHOCYTES # BLD AUTO: 1 K/UL (ref 1–5.5)
LYMPHOCYTES NFR BLD AUTO: 20 % (ref 20.5–51.5)
MCH RBC QN AUTO: 33 PG (ref 27–31)
MCHC RBC AUTO-ENTMCNC: 33 % (ref 32–36)
MCV RBC AUTO: 99 FL (ref 79–98)
MONOCYTES # BLD MANUAL: 0.3 K/UL (ref 0–1)
MONOCYTES # BLD MANUAL: 7.1 % (ref 1.7–9.3)
NEUTROPHILS # BLD AUTO: 3.4 K/UL (ref 1.8–7.7)
NEUTROPHILS NFR BLD AUTO: 69.1 % (ref 40–70)
PLATELET # BLD AUTO: 168 K/UL (ref 130–430)
PROTHROMBIN TIME: 11.3 SECS (ref 9.5–12.5)
RBC # BLD AUTO: 3.42 MIL/UL (ref 4.2–6.2)
WBC # BLD AUTO: 4.9 K/UL (ref 4.8–10.8)

## 2022-12-13 PROCEDURE — 5A1D70Z PERFORMANCE OF URINARY FILTRATION, INTERMITTENT, LESS THAN 6 HOURS PER DAY: ICD-10-PCS

## 2022-12-13 RX ADMIN — Medication SCH EA: at 12:09

## 2022-12-13 RX ADMIN — Medication SCH EA: at 21:00

## 2022-12-13 RX ADMIN — Medication SCH EA: at 18:17

## 2022-12-13 RX ADMIN — CASTOR OIL AND BALSAM, PERU SCH GM: 788; 87 OINTMENT TOPICAL at 12:11

## 2022-12-13 RX ADMIN — Medication SCH MG: at 09:00

## 2022-12-13 RX ADMIN — HUMAN INSULIN PRN UNITS: 100 INJECTION, SOLUTION SUBCUTANEOUS at 23:34

## 2022-12-13 RX ADMIN — HUMAN INSULIN PRN UNITS: 100 INJECTION, SOLUTION SUBCUTANEOUS at 18:22

## 2022-12-13 RX ADMIN — LINEZOLID SCH MG: 600 TABLET, FILM COATED ORAL at 09:00

## 2022-12-13 RX ADMIN — HEPARIN SODIUM SCH UNITS: 5000 INJECTION, SOLUTION INTRAVENOUS; SUBCUTANEOUS at 21:00

## 2022-12-13 RX ADMIN — CARVEDILOL SCH MG: 6.25 TABLET, FILM COATED ORAL at 21:00

## 2022-12-13 RX ADMIN — ATORVASTATIN CALCIUM SCH MG: 10 TABLET, FILM COATED ORAL at 09:00

## 2022-12-13 RX ADMIN — LINEZOLID SCH MG: 600 TABLET, FILM COATED ORAL at 23:31

## 2022-12-13 RX ADMIN — CARVEDILOL SCH MG: 6.25 TABLET, FILM COATED ORAL at 09:00

## 2022-12-13 RX ADMIN — Medication SCH EA: at 06:31

## 2022-12-13 RX ADMIN — HEPARIN SODIUM SCH UNITS: 5000 INJECTION, SOLUTION INTRAVENOUS; SUBCUTANEOUS at 09:00

## 2022-12-14 VITALS — SYSTOLIC BLOOD PRESSURE: 137 MMHG

## 2022-12-14 VITALS — SYSTOLIC BLOOD PRESSURE: 114 MMHG

## 2022-12-14 VITALS — SYSTOLIC BLOOD PRESSURE: 110 MMHG

## 2022-12-14 VITALS — SYSTOLIC BLOOD PRESSURE: 102 MMHG

## 2022-12-14 VITALS — SYSTOLIC BLOOD PRESSURE: 101 MMHG

## 2022-12-14 LAB
ANION GAP SERPL CALCULATED.3IONS-SCNC: 9 MMOL/L (ref 5–15)
BUN SERPL-MCNC: 31 MG/DL (ref 8–21)
CALCIUM SERPL-MCNC: 7.9 MG/DL (ref 8.4–11)
CHLORIDE SERPL-SCNC: 101 MMOL/L (ref 98–107)
CREAT SERPL-MCNC: 4.49 MG/DL (ref 0.55–1.3)
GFR SERPL CREATININE-BSD FRML MDRD: (no result) ML/MIN (ref 90–?)
GLUCOSE SERPL-MCNC: 196 MG/DL (ref 70–99)

## 2022-12-14 RX ADMIN — HEPARIN SODIUM SCH UNITS: 5000 INJECTION, SOLUTION INTRAVENOUS; SUBCUTANEOUS at 09:00

## 2022-12-14 RX ADMIN — HUMAN INSULIN PRN UNITS: 100 INJECTION, SOLUTION SUBCUTANEOUS at 11:44

## 2022-12-14 RX ADMIN — Medication SCH EA: at 06:21

## 2022-12-14 RX ADMIN — LINEZOLID SCH MG: 600 TABLET, FILM COATED ORAL at 11:52

## 2022-12-14 RX ADMIN — HUMAN INSULIN PRN UNITS: 100 INJECTION, SOLUTION SUBCUTANEOUS at 06:17

## 2022-12-14 RX ADMIN — ATORVASTATIN CALCIUM SCH MG: 10 TABLET, FILM COATED ORAL at 11:53

## 2022-12-14 RX ADMIN — CASTOR OIL AND BALSAM, PERU SCH GM: 788; 87 OINTMENT TOPICAL at 09:00

## 2022-12-14 RX ADMIN — CARVEDILOL SCH MG: 6.25 TABLET, FILM COATED ORAL at 11:52

## 2022-12-14 RX ADMIN — Medication SCH MG: at 11:52

## 2022-12-14 RX ADMIN — Medication SCH EA: at 11:45

## 2023-02-01 ENCOUNTER — HOSPITAL ENCOUNTER (EMERGENCY)
Dept: HOSPITAL 4 - SED | Age: 78
Discharge: NURSING, REHAB OR LONG-TERM CARE | End: 2023-02-01
Payer: COMMERCIAL

## 2023-02-01 VITALS — SYSTOLIC BLOOD PRESSURE: 113 MMHG

## 2023-02-01 VITALS — SYSTOLIC BLOOD PRESSURE: 128 MMHG

## 2023-02-01 VITALS — HEIGHT: 64 IN | WEIGHT: 170 LBS | BODY MASS INDEX: 29.02 KG/M2
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
## 2023-02-01 DIAGNOSIS — I50.9: ICD-10-CM

## 2023-02-01 DIAGNOSIS — E11.9: ICD-10-CM
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
## 2023-02-01 DIAGNOSIS — I11.0: ICD-10-CM

## 2023-02-01 DIAGNOSIS — S09.90XA: Primary | ICD-10-CM
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
## 2023-02-01 DIAGNOSIS — Z79.899: ICD-10-CM

## 2023-02-01 LAB
ALBUMIN SERPL BCP-MCNC: 2.1 G/DL (ref 3.4–4.8)
ALT SERPL W P-5'-P-CCNC: 14 U/L (ref 12–78)
ANION GAP SERPL CALCULATED.3IONS-SCNC: 8 MMOL/L (ref 5–15)
AST SERPL W P-5'-P-CCNC: 23 U/L (ref 10–37)
BASOPHILS # BLD AUTO: 0.1 K/UL (ref 0–0.2)
BASOPHILS NFR BLD AUTO: 1 % (ref 0–2)
BILIRUB SERPL-MCNC: 0.5 MG/DL (ref 0–1)
BUN SERPL-MCNC: 28 MG/DL (ref 8–21)
CALCIUM SERPL-MCNC: 8.6 MG/DL (ref 8.4–11)
CHLORIDE SERPL-SCNC: 98 MMOL/L (ref 98–107)
CREAT SERPL-MCNC: 4.02 MG/DL (ref 0.55–1.3)
EOSINOPHIL # BLD AUTO: 0.1 K/UL (ref 0–0.4)
EOSINOPHIL NFR BLD AUTO: 1.2 % (ref 0–4)
ERYTHROCYTE [DISTWIDTH] IN BLOOD BY AUTOMATED COUNT: 19.3 % (ref 9–15)
GFR SERPL CREATININE-BSD FRML MDRD: (no result) ML/MIN (ref 90–?)
GLUCOSE SERPL-MCNC: 372 MG/DL (ref 70–99)
HCT VFR BLD AUTO: 30.3 % (ref 36–48)
HGB BLD-MCNC: 9.8 G/DL (ref 12–16)
LYMPHOCYTES # BLD AUTO: 0.7 K/UL (ref 1–5.5)
LYMPHOCYTES NFR BLD AUTO: 5.5 % (ref 20.5–51.5)
MCH RBC QN AUTO: 33 PG (ref 27–31)
MCHC RBC AUTO-ENTMCNC: 33 % (ref 32–36)
MCV RBC AUTO: 102 FL (ref 79–98)
MONOCYTES # BLD MANUAL: 0.8 K/UL (ref 0–1)
MONOCYTES # BLD MANUAL: 6.2 % (ref 1.7–9.3)
NEUTROPHILS # BLD AUTO: 10.4 K/UL (ref 1.8–7.7)
NEUTROPHILS NFR BLD AUTO: 86.1 % (ref 40–70)
PLATELET # BLD AUTO: 198 K/UL (ref 130–430)
RBC # BLD AUTO: 2.96 MIL/UL (ref 4.2–6.2)
WBC # BLD AUTO: 12.1 K/UL (ref 4.8–10.8)

## 2023-02-06 ENCOUNTER — HOSPITAL ENCOUNTER (INPATIENT)
Dept: HOSPITAL 4 - SED | Age: 78
LOS: 5 days | Discharge: NURSING, REHAB OR LONG-TERM CARE | DRG: 70 | End: 2023-02-11
Attending: FAMILY MEDICINE | Admitting: FAMILY MEDICINE
Payer: COMMERCIAL

## 2023-02-06 VITALS — SYSTOLIC BLOOD PRESSURE: 123 MMHG

## 2023-02-06 VITALS — WEIGHT: 187 LBS | HEIGHT: 63 IN | BODY MASS INDEX: 33.13 KG/M2

## 2023-02-06 VITALS — SYSTOLIC BLOOD PRESSURE: 133 MMHG
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
## 2023-02-06 DIAGNOSIS — Z91.81: ICD-10-CM

## 2023-02-06 DIAGNOSIS — Z86.73: ICD-10-CM

## 2023-02-06 DIAGNOSIS — E87.1: ICD-10-CM

## 2023-02-06 DIAGNOSIS — G93.41: Primary | ICD-10-CM

## 2023-02-06 DIAGNOSIS — Z99.2: ICD-10-CM

## 2023-02-06 DIAGNOSIS — I12.0: ICD-10-CM
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
## 2023-02-06 DIAGNOSIS — Z20.822: ICD-10-CM

## 2023-02-06 DIAGNOSIS — N39.0: ICD-10-CM

## 2023-02-06 DIAGNOSIS — I51.7: ICD-10-CM

## 2023-02-06 DIAGNOSIS — D63.1: ICD-10-CM

## 2023-02-06 DIAGNOSIS — S09.93XA: ICD-10-CM

## 2023-02-06 DIAGNOSIS — E11.649: ICD-10-CM

## 2023-02-06 DIAGNOSIS — N18.6: ICD-10-CM
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
## 2023-02-06 DIAGNOSIS — E11.22: ICD-10-CM

## 2023-02-06 LAB
ACETONE EXG QL: NEGATIVE
ALBUMIN SERPL BCP-MCNC: 2.4 G/DL (ref 3.4–4.8)
ALT SERPL W P-5'-P-CCNC: 13 U/L (ref 12–78)
AMPHETAMINES UR QL SCN: NEGATIVE
ANION GAP SERPL CALCULATED.3IONS-SCNC: 15 MMOL/L (ref 5–15)
APAP SERPL-MCNC: < 1 UG/ML (ref 1–30)
APPEARANCE UR: (no result)
AST SERPL W P-5'-P-CCNC: 15 U/L (ref 10–37)
BACTERIA URNS QL MICRO: (no result) /HPF
BARBITURATES UR QL SCN: NEGATIVE
BASOPHILS # BLD AUTO: 0.1 K/UL (ref 0–0.2)
BASOPHILS NFR BLD AUTO: 0.6 % (ref 0–2)
BENZODIAZ UR QL SCN: NEGATIVE
BILIRUB SERPL-MCNC: 0.8 MG/DL (ref 0–1)
BILIRUB UR QL STRIP: NEGATIVE
BUN SERPL-MCNC: 51 MG/DL (ref 8–21)
BZE UR QL SCN: NEGATIVE
CALCIUM SERPL-MCNC: 8.8 MG/DL (ref 8.4–11)
CANNABINOIDS UR QL SCN: NEGATIVE
CHLORIDE SERPL-SCNC: 97 MMOL/L (ref 98–107)
COLOR UR: YELLOW
CREAT SERPL-MCNC: 6.92 MG/DL (ref 0.55–1.3)
CRP SERPL-MCNC: 22.4 MG/DL (ref 0–0.5)
EOSINOPHIL # BLD AUTO: 0.1 K/UL (ref 0–0.4)
EOSINOPHIL NFR BLD AUTO: 0.5 % (ref 0–4)
ERYTHROCYTE [DISTWIDTH] IN BLOOD BY AUTOMATED COUNT: 18.4 % (ref 9–15)
ETHANOL SERPL-MCNC: < 3 MG/DL (ref ?–10)
GFR SERPL CREATININE-BSD FRML MDRD: (no result) ML/MIN (ref 90–?)
GLUCOSE SERPL-MCNC: 132 MG/DL (ref 70–99)
GLUCOSE UR STRIP-MCNC: NEGATIVE MG/DL
HCT VFR BLD AUTO: 30.1 % (ref 36–48)
HGB BLD-MCNC: 9.9 G/DL (ref 12–16)
HGB UR QL STRIP: (no result)
INR PPP: 1.4 (ref 0.8–1.2)
KETONES UR STRIP-MCNC: NEGATIVE MG/DL
LEUKOCYTE ESTERASE UR QL STRIP: (no result)
LYMPHOCYTES # BLD AUTO: 0.6 K/UL (ref 1–5.5)
LYMPHOCYTES NFR BLD AUTO: 5.4 % (ref 20.5–51.5)
MCH RBC QN AUTO: 32 PG (ref 27–31)
MCHC RBC AUTO-ENTMCNC: 33 % (ref 32–36)
MCV RBC AUTO: 97 FL (ref 79–98)
METHADONE UR-SCNC: NEGATIVE UMOL/L
METHAMPHET UR-SCNC: NEGATIVE UMOL/L
MONOCYTES # BLD MANUAL: 0.4 K/UL (ref 0–1)
MONOCYTES # BLD MANUAL: 3.8 % (ref 1.7–9.3)
MUCOUS THREADS URNS QL MICRO: (no result) /LPF
NEUTROPHILS # BLD AUTO: 10.6 K/UL (ref 1.8–7.7)
NEUTROPHILS NFR BLD AUTO: 89.7 % (ref 40–70)
NITRITE UR QL STRIP: NEGATIVE
OPIATES UR QL SCN: POSITIVE
OXYCODONE SERPL-MCNC: NEGATIVE NG/ML
PCP UR QL SCN: NEGATIVE
PH UR STRIP: 6.5 [PH] (ref 5–8)
PLATELET # BLD AUTO: 185 K/UL (ref 130–430)
PROT UR QL STRIP: (no result)
PROTHROMBIN TIME: 14.1 SECS (ref 9.5–12.5)
RBC # BLD AUTO: 3.09 MIL/UL (ref 4.2–6.2)
SP GR UR STRIP: 1.01 (ref 1–1.03)
TRICYCLICS UR-MCNC: NEGATIVE NG/ML
URINE PROPOXYPHENE SCREEN: NEGATIVE
UROBILINOGEN UR STRIP-MCNC: 0.2 MG/DL (ref 0.2–1)
WBC # BLD AUTO: 11.8 K/UL (ref 4.8–10.8)
WBC #/AREA URNS HPF: >100 /HPF (ref 0–3)

## 2023-02-06 PROCEDURE — G0481 DRUG TEST DEF 8-14 CLASSES: HCPCS

## 2023-02-06 PROCEDURE — 5A1D70Z PERFORMANCE OF URINARY FILTRATION, INTERMITTENT, LESS THAN 6 HOURS PER DAY: ICD-10-PCS | Performed by: FAMILY MEDICINE

## 2023-02-06 PROCEDURE — G0480 DRUG TEST DEF 1-7 CLASSES: HCPCS

## 2023-02-06 PROCEDURE — G0482 DRUG TEST DEF 15-21 CLASSES: HCPCS

## 2023-02-06 PROCEDURE — G0378 HOSPITAL OBSERVATION PER HR: HCPCS

## 2023-02-06 RX ADMIN — CARVEDILOL SCH MG: 12.5 TABLET, FILM COATED ORAL at 21:17

## 2023-02-06 RX ADMIN — LINEZOLID SCH MLS/HR: 600 INJECTION, SOLUTION INTRAVENOUS at 21:29

## 2023-02-06 RX ADMIN — DEXTROSE AND SODIUM CHLORIDE SCH MLS/HR: 5; 450 INJECTION, SOLUTION INTRAVENOUS at 17:00

## 2023-02-06 NOTE — NUR
Pt bib EMS from [facility] for assessment. Per EMS pt is refusing dialysis 
today and ALOC. Pt presents lethargic and but oriented x3. Pt face is bruised, 
was seen here 5 days ago for fall and full work up done. Pt was discharge back 
to SNF. Dr. Hubbard at bedside assessing patient. V/S stable.

## 2023-02-06 NOTE — NUR
Patient will be admitted to care of Dr. Bui.  Admitted to Tele unit.  Will go 
to room 132A.  Belongings list completed.  Complete and up to date summary 
report printed. SBAR report to be given at bedside with opportunity for 
questions.

## 2023-02-06 NOTE — NUR
# 20 gauge angiocath placed to LEJ by Dr. Hubbard.  Use of asceptic technique.  
Opsite placed over site.  Blood return noted.  Flushed with 10 cc of normal 
saline.  No evidence of infiltration noted.  Patient tolerated well.

## 2023-02-06 NOTE — NUR
Admit bed requested 



Patient will be admitted to care of Dr. MARIE.  

Admitted to TELEMETRY unit.

Diagnosis METABOLIC ENCEPHALOPATHY

Inpatient (Yes or No)  YES

Observation (Yes or No) NO

Orientation concerns or request close to nursing station  (Yes or No) NO

Covid Status NEGATIVE

On vent or bipap NO

Isolation requirements NO

Needs a sitter NO

From Home (Yes or if No enter name of facility) NO, [facility]

Requires Dialysis (Yes or No) YES 

Med Rec Completed (Yes of No) YES

## 2023-02-06 NOTE — NUR
Placed in room 7  . Placed on cardiac monitor, blood pressure machine and pulse 
oximeter. To gown for exam. Side rails up.

## 2023-02-07 VITALS — SYSTOLIC BLOOD PRESSURE: 118 MMHG

## 2023-02-07 VITALS — SYSTOLIC BLOOD PRESSURE: 92 MMHG

## 2023-02-07 VITALS — SYSTOLIC BLOOD PRESSURE: 100 MMHG

## 2023-02-07 VITALS — SYSTOLIC BLOOD PRESSURE: 133 MMHG

## 2023-02-07 VITALS — SYSTOLIC BLOOD PRESSURE: 128 MMHG

## 2023-02-07 VITALS — SYSTOLIC BLOOD PRESSURE: 95 MMHG

## 2023-02-07 VITALS — SYSTOLIC BLOOD PRESSURE: 120 MMHG

## 2023-02-07 LAB
ANION GAP SERPL CALCULATED.3IONS-SCNC: 16 MMOL/L (ref 5–15)
BASOPHILS # BLD AUTO: 0 K/UL (ref 0–0.2)
BASOPHILS NFR BLD AUTO: 0.3 % (ref 0–2)
BUN SERPL-MCNC: 31 MG/DL (ref 8–21)
CALCIUM SERPL-MCNC: 8.4 MG/DL (ref 8.4–11)
CHLORIDE SERPL-SCNC: 97 MMOL/L (ref 98–107)
CREAT SERPL-MCNC: 5.34 MG/DL (ref 0.55–1.3)
EOSINOPHIL # BLD AUTO: 0 K/UL (ref 0–0.4)
EOSINOPHIL NFR BLD AUTO: 0.2 % (ref 0–4)
ERYTHROCYTE [DISTWIDTH] IN BLOOD BY AUTOMATED COUNT: 18.4 % (ref 9–15)
GFR SERPL CREATININE-BSD FRML MDRD: (no result) ML/MIN (ref 90–?)
GLUCOSE SERPL-MCNC: 170 MG/DL (ref 70–99)
HCT VFR BLD AUTO: 28.5 % (ref 36–48)
HGB BLD-MCNC: 9.3 G/DL (ref 12–16)
LYMPHOCYTES # BLD AUTO: 0.6 K/UL (ref 1–5.5)
LYMPHOCYTES NFR BLD AUTO: 5 % (ref 20.5–51.5)
MCH RBC QN AUTO: 32 PG (ref 27–31)
MCHC RBC AUTO-ENTMCNC: 33 % (ref 32–36)
MCV RBC AUTO: 98 FL (ref 79–98)
MONOCYTES # BLD MANUAL: 0.9 K/UL (ref 0–1)
MONOCYTES # BLD MANUAL: 8 % (ref 1.7–9.3)
NEUTROPHILS # BLD AUTO: 9.7 K/UL (ref 1.8–7.7)
NEUTROPHILS NFR BLD AUTO: 86.5 % (ref 40–70)
PHOSPHATE SERPL-MCNC: 4.5 MG/DL (ref 2.7–4.5)
PLATELET # BLD AUTO: 197 K/UL (ref 130–430)
RBC # BLD AUTO: 2.91 MIL/UL (ref 4.2–6.2)
WBC # BLD AUTO: 11.2 K/UL (ref 4.8–10.8)

## 2023-02-07 RX ADMIN — HYDROMORPHONE HYDROCHLORIDE PRN MG: 8 TABLET ORAL at 20:15

## 2023-02-07 RX ADMIN — CARVEDILOL SCH MG: 12.5 TABLET, FILM COATED ORAL at 08:51

## 2023-02-07 RX ADMIN — CARVEDILOL SCH MG: 12.5 TABLET, FILM COATED ORAL at 20:26

## 2023-02-07 RX ADMIN — Medication SCH EA: at 20:21

## 2023-02-07 RX ADMIN — LINEZOLID SCH MLS/HR: 600 INJECTION, SOLUTION INTRAVENOUS at 20:27

## 2023-02-07 RX ADMIN — LINEZOLID SCH MLS/HR: 600 INJECTION, SOLUTION INTRAVENOUS at 09:29

## 2023-02-07 RX ADMIN — Medication SCH EA: at 17:13

## 2023-02-07 RX ADMIN — ATORVASTATIN CALCIUM SCH MG: 10 TABLET, FILM COATED ORAL at 08:50

## 2023-02-07 RX ADMIN — DEXTROSE AND SODIUM CHLORIDE SCH MLS/HR: 5; 450 INJECTION, SOLUTION INTRAVENOUS at 09:24

## 2023-02-07 RX ADMIN — HYDROMORPHONE HYDROCHLORIDE PRN MG: 8 TABLET ORAL at 14:45

## 2023-02-07 RX ADMIN — HUMAN INSULIN PRN UNITS: 100 INJECTION, SOLUTION SUBCUTANEOUS at 17:15

## 2023-02-07 RX ADMIN — HUMAN INSULIN PRN UNITS: 100 INJECTION, SOLUTION SUBCUTANEOUS at 20:23

## 2023-02-07 NOTE — NUR
OPENING NOTE

PT LYING BED. NO ACUTE S/S OF DISTRESS. COMPLAINED ABOUT HER HIP PAIN 3/10. GIVEN 500MG 
ACETAMINOPHEN. BREATHING EVEN AND NONLABORED. REPOSITION FOR COMFORT. SAFETY CHECKS IN 
PLACE. BED ALARM ON. CALL LIGHT IN REACH.

## 2023-02-08 VITALS — SYSTOLIC BLOOD PRESSURE: 116 MMHG

## 2023-02-08 VITALS — SYSTOLIC BLOOD PRESSURE: 102 MMHG

## 2023-02-08 VITALS — SYSTOLIC BLOOD PRESSURE: 101 MMHG

## 2023-02-08 VITALS — SYSTOLIC BLOOD PRESSURE: 105 MMHG

## 2023-02-08 VITALS — SYSTOLIC BLOOD PRESSURE: 99 MMHG

## 2023-02-08 VITALS — SYSTOLIC BLOOD PRESSURE: 95 MMHG

## 2023-02-08 LAB
ANION GAP SERPL CALCULATED.3IONS-SCNC: 12 MMOL/L (ref 5–15)
BASOPHILS # BLD AUTO: 0 K/UL (ref 0–0.2)
BASOPHILS NFR BLD AUTO: 0.4 % (ref 0–2)
BUN SERPL-MCNC: 41 MG/DL (ref 8–21)
CALCIUM SERPL-MCNC: 7.6 MG/DL (ref 8.4–11)
CHLORIDE SERPL-SCNC: 95 MMOL/L (ref 98–107)
CREAT SERPL-MCNC: 6.56 MG/DL (ref 0.55–1.3)
EOSINOPHIL # BLD AUTO: 0.2 K/UL (ref 0–0.4)
EOSINOPHIL NFR BLD AUTO: 2.3 % (ref 0–4)
ERYTHROCYTE [DISTWIDTH] IN BLOOD BY AUTOMATED COUNT: 18.6 % (ref 9–15)
GFR SERPL CREATININE-BSD FRML MDRD: (no result) ML/MIN (ref 90–?)
GLUCOSE SERPL-MCNC: 253 MG/DL (ref 70–99)
HCT VFR BLD AUTO: 28.3 % (ref 36–48)
HGB BLD-MCNC: 9.3 G/DL (ref 12–16)
LYMPHOCYTES # BLD AUTO: 0.4 K/UL (ref 1–5.5)
LYMPHOCYTES NFR BLD AUTO: 4.7 % (ref 20.5–51.5)
MCH RBC QN AUTO: 32 PG (ref 27–31)
MCHC RBC AUTO-ENTMCNC: 33 % (ref 32–36)
MCV RBC AUTO: 98 FL (ref 79–98)
MONOCYTES # BLD MANUAL: 0.6 K/UL (ref 0–1)
MONOCYTES # BLD MANUAL: 6.5 % (ref 1.7–9.3)
NEUTROPHILS # BLD AUTO: 8 K/UL (ref 1.8–7.7)
NEUTROPHILS NFR BLD AUTO: 86.1 % (ref 40–70)
PLATELET # BLD AUTO: 211 K/UL (ref 130–430)
RBC # BLD AUTO: 2.9 MIL/UL (ref 4.2–6.2)
WBC # BLD AUTO: 9.2 K/UL (ref 4.8–10.8)

## 2023-02-08 PROCEDURE — 5A1D70Z PERFORMANCE OF URINARY FILTRATION, INTERMITTENT, LESS THAN 6 HOURS PER DAY: ICD-10-PCS | Performed by: FAMILY MEDICINE

## 2023-02-08 RX ADMIN — LINEZOLID SCH MLS/HR: 600 INJECTION, SOLUTION INTRAVENOUS at 08:39

## 2023-02-08 RX ADMIN — HYDROMORPHONE HYDROCHLORIDE PRN MG: 8 TABLET ORAL at 08:38

## 2023-02-08 RX ADMIN — Medication SCH EA: at 06:10

## 2023-02-08 RX ADMIN — HUMAN INSULIN PRN UNITS: 100 INJECTION, SOLUTION SUBCUTANEOUS at 16:56

## 2023-02-08 RX ADMIN — HUMAN INSULIN PRN UNITS: 100 INJECTION, SOLUTION SUBCUTANEOUS at 06:11

## 2023-02-08 RX ADMIN — Medication SCH EA: at 12:01

## 2023-02-08 RX ADMIN — ATORVASTATIN CALCIUM SCH MG: 10 TABLET, FILM COATED ORAL at 08:40

## 2023-02-08 RX ADMIN — Medication SCH EA: at 16:56

## 2023-02-08 RX ADMIN — TRAMADOL HYDROCHLORIDE PRN MG: 50 TABLET, FILM COATED ORAL at 12:59

## 2023-02-08 RX ADMIN — CARVEDILOL SCH MG: 12.5 TABLET, FILM COATED ORAL at 20:11

## 2023-02-08 RX ADMIN — ERYTHROPOIETIN SCH UNITS: 10000 INJECTION, SOLUTION INTRAVENOUS; SUBCUTANEOUS at 16:57

## 2023-02-08 RX ADMIN — Medication SCH EA: at 20:16

## 2023-02-08 RX ADMIN — CARVEDILOL SCH MG: 12.5 TABLET, FILM COATED ORAL at 08:39

## 2023-02-08 RX ADMIN — DEXTROSE AND SODIUM CHLORIDE SCH MLS/HR: 5; 450 INJECTION, SOLUTION INTRAVENOUS at 05:06

## 2023-02-08 RX ADMIN — HUMAN INSULIN PRN UNITS: 100 INJECTION, SOLUTION SUBCUTANEOUS at 12:29

## 2023-02-08 RX ADMIN — HUMAN INSULIN PRN UNITS: 100 INJECTION, SOLUTION SUBCUTANEOUS at 21:38

## 2023-02-08 RX ADMIN — LINEZOLID SCH MLS/HR: 600 INJECTION, SOLUTION INTRAVENOUS at 20:11

## 2023-02-08 NOTE — NUR
Dietitian Recommendations 

* If BUN and Creatinine levels are stable, consider CCHO diet instead

* Ordered: Daniel BID



Submitted for Vicenta Nguyen by Kyra Arevalo, MPH, RD



Please see Nutrition Assessment for further details


-------------------------------------------------------------------------------

Addendum: 02/08/23 at 1527 by Kyra Arevalo RD

-------------------------------------------------------------------------------

Amended: Links added.

## 2023-02-08 NOTE — NUR
CLOSING NOTE

PT LYING IN BED. BREATHING EVEN AND NONLABORED. NO S/S ACUTE DISTRESS OR PAIN. SAFETY CHECKS 
IN PLACE. BED LOWEST POSITION AND ALARM ON. CALL LIGHT IN REACH.

## 2023-02-09 VITALS — SYSTOLIC BLOOD PRESSURE: 91 MMHG

## 2023-02-09 VITALS — SYSTOLIC BLOOD PRESSURE: 111 MMHG

## 2023-02-09 VITALS — SYSTOLIC BLOOD PRESSURE: 110 MMHG

## 2023-02-09 VITALS — SYSTOLIC BLOOD PRESSURE: 104 MMHG

## 2023-02-09 LAB
ANION GAP SERPL CALCULATED.3IONS-SCNC: 13 MMOL/L (ref 5–15)
BUN SERPL-MCNC: 27 MG/DL (ref 8–21)
CALCIUM SERPL-MCNC: 8.2 MG/DL (ref 8.4–11)
CHLORIDE SERPL-SCNC: 99 MMOL/L (ref 98–107)
CREAT SERPL-MCNC: 5.03 MG/DL (ref 0.55–1.3)
GFR SERPL CREATININE-BSD FRML MDRD: (no result) ML/MIN (ref 90–?)
GLUCOSE SERPL-MCNC: 232 MG/DL (ref 70–99)

## 2023-02-09 RX ADMIN — TRAMADOL HYDROCHLORIDE PRN MG: 50 TABLET, FILM COATED ORAL at 12:36

## 2023-02-09 RX ADMIN — Medication SCH EA: at 11:26

## 2023-02-09 RX ADMIN — ATORVASTATIN CALCIUM SCH MG: 10 TABLET, FILM COATED ORAL at 09:55

## 2023-02-09 RX ADMIN — LINEZOLID SCH MLS/HR: 600 INJECTION, SOLUTION INTRAVENOUS at 09:55

## 2023-02-09 RX ADMIN — HUMAN INSULIN PRN UNITS: 100 INJECTION, SOLUTION SUBCUTANEOUS at 06:18

## 2023-02-09 RX ADMIN — HYDROMORPHONE HYDROCHLORIDE PRN MG: 8 TABLET ORAL at 05:06

## 2023-02-09 RX ADMIN — HUMAN INSULIN PRN UNITS: 100 INJECTION, SOLUTION SUBCUTANEOUS at 11:30

## 2023-02-09 RX ADMIN — HUMAN INSULIN PRN UNITS: 100 INJECTION, SOLUTION SUBCUTANEOUS at 21:34

## 2023-02-09 RX ADMIN — Medication SCH EA: at 06:13

## 2023-02-09 RX ADMIN — CARVEDILOL SCH MG: 12.5 TABLET, FILM COATED ORAL at 21:00

## 2023-02-09 RX ADMIN — Medication SCH EA: at 17:45

## 2023-02-09 RX ADMIN — LINEZOLID SCH MLS/HR: 600 INJECTION, SOLUTION INTRAVENOUS at 21:15

## 2023-02-09 RX ADMIN — DEXTROSE AND SODIUM CHLORIDE SCH MLS/HR: 5; 450 INJECTION, SOLUTION INTRAVENOUS at 09:55

## 2023-02-09 RX ADMIN — CARVEDILOL SCH MG: 12.5 TABLET, FILM COATED ORAL at 09:55

## 2023-02-09 RX ADMIN — HYDROMORPHONE HYDROCHLORIDE PRN MG: 8 TABLET ORAL at 21:16

## 2023-02-09 RX ADMIN — Medication SCH EA: at 21:21

## 2023-02-09 RX ADMIN — HUMAN INSULIN PRN UNITS: 100 INJECTION, SOLUTION SUBCUTANEOUS at 17:48

## 2023-02-09 RX ADMIN — HYDROMORPHONE HYDROCHLORIDE PRN MG: 8 TABLET ORAL at 17:48

## 2023-02-09 NOTE — NUR
phone call to Nusrat at [facility] [phone] . [facility] . Nusrat will call back with bed availability

## 2023-02-09 NOTE — NUR
Dr. Wall at [facility] and has been updated with plan of care. He stated he will have her 
dialyzed tomorrow. He was also informed of the pt's glucose levels. Order received to stop

## 2023-02-09 NOTE — NUR
I spoke with the daughter Amor to inform her that her mother was being transferred back 
to her prior facility [facility]. She addressed the concern of her mother not being able to sit 
in a W/C for transport to her HD center. PT spoke with me today to see the pt but no notes 
are present. I have then spoken with Nusrat from [facility] and she informed me that if 
the pt can not sit in the W/C for her usual transport to HD then they would have to go 
through her insurance to arrange for gurney but would not be able to completed until Monday 
which would cause her to miss on tomorrow if she was transferred back to the facility 
[facility]. I have spoken with Dr. Lerma and informed him of the situation and he requested 
[facility] Charge RN, Tita, has been made 
aware and will arrange for DC after HD tomorrow.

## 2023-02-09 NOTE — NUR
Patient to be discharged back to [facility] room 111. [facility] transportation 
[phone]. ETA  20:30. will place packet on unit. MALI to notify RN.

## 2023-02-10 VITALS — SYSTOLIC BLOOD PRESSURE: 129 MMHG

## 2023-02-10 VITALS — SYSTOLIC BLOOD PRESSURE: 105 MMHG

## 2023-02-10 VITALS — SYSTOLIC BLOOD PRESSURE: 114 MMHG

## 2023-02-10 VITALS — SYSTOLIC BLOOD PRESSURE: 120 MMHG

## 2023-02-10 VITALS — SYSTOLIC BLOOD PRESSURE: 107 MMHG

## 2023-02-10 LAB
ANION GAP SERPL CALCULATED.3IONS-SCNC: 15 MMOL/L (ref 5–15)
BUN SERPL-MCNC: 41 MG/DL (ref 8–21)
CALCIUM SERPL-MCNC: 8.7 MG/DL (ref 8.4–11)
CHLORIDE SERPL-SCNC: 95 MMOL/L (ref 98–107)
CREAT SERPL-MCNC: 5.94 MG/DL (ref 0.55–1.3)
GFR SERPL CREATININE-BSD FRML MDRD: (no result) ML/MIN (ref 90–?)
GLUCOSE SERPL-MCNC: 325 MG/DL (ref 70–99)

## 2023-02-10 PROCEDURE — 5A1D70Z PERFORMANCE OF URINARY FILTRATION, INTERMITTENT, LESS THAN 6 HOURS PER DAY: ICD-10-PCS | Performed by: FAMILY MEDICINE

## 2023-02-10 RX ADMIN — HUMAN INSULIN PRN UNITS: 100 INJECTION, SOLUTION SUBCUTANEOUS at 18:22

## 2023-02-10 RX ADMIN — ERYTHROPOIETIN SCH UNITS: 10000 INJECTION, SOLUTION INTRAVENOUS; SUBCUTANEOUS at 18:19

## 2023-02-10 RX ADMIN — Medication SCH EA: at 06:23

## 2023-02-10 RX ADMIN — ATORVASTATIN CALCIUM SCH MG: 10 TABLET, FILM COATED ORAL at 10:17

## 2023-02-10 RX ADMIN — HUMAN INSULIN PRN UNITS: 100 INJECTION, SOLUTION SUBCUTANEOUS at 21:15

## 2023-02-10 RX ADMIN — CARVEDILOL SCH MG: 12.5 TABLET, FILM COATED ORAL at 21:00

## 2023-02-10 RX ADMIN — LINEZOLID SCH MLS/HR: 600 INJECTION, SOLUTION INTRAVENOUS at 21:00

## 2023-02-10 RX ADMIN — Medication SCH EA: at 11:42

## 2023-02-10 RX ADMIN — HUMAN INSULIN PRN UNITS: 100 INJECTION, SOLUTION SUBCUTANEOUS at 06:29

## 2023-02-10 RX ADMIN — CARVEDILOL SCH MG: 12.5 TABLET, FILM COATED ORAL at 09:00

## 2023-02-10 RX ADMIN — LINEZOLID SCH MLS/HR: 600 INJECTION, SOLUTION INTRAVENOUS at 10:22

## 2023-02-10 RX ADMIN — HYDROMORPHONE HYDROCHLORIDE PRN MG: 8 TABLET ORAL at 10:21

## 2023-02-10 RX ADMIN — Medication SCH EA: at 21:01

## 2023-02-10 RX ADMIN — Medication SCH EA: at 18:18

## 2023-02-10 NOTE — NUR
[facility] TRANSPORT COMMUNICATION 

Spoke to [facility] dispatch. Patient is placed on will call. ETA likely 9:00 PM (2100).

## 2023-02-10 NOTE — NUR
OPENING NOTE

Received report from CYNTHIA Soto. Upon entering room, patient is in bed, awake and oriented 
x3. Patient states hip pain at this time. EJ site intact and saline locked. Call light 
within reach. Safety precautions observed.

## 2023-02-10 NOTE — NUR
WOUND EVALUATION:



Wound Consult received from Dr. Lerma. Thank you, Dr. Lerma, for the consult.



Patient received in a Sanket Bed with a mattress, awake, alert, and oriented. Patient is 
unable to turn independently. Alberto Score is a 13.  Past Medical History: Hypertension, 
Diabetes Mellitus, End-Stage Renal Failure, on Hemodialysis, old stroke. Patient presented 
to the Emergency Room with ALOC.  Initial workup significant for Acute Metabolic 
Encephalopathy, and soft tissue trauma to the face. There is also evidence of an old 
Bilateral Basal Ganglia Lacunar Infarction.  Recent Labs: WBC 9.2, RBC 2.90, hemoglobin 9.3, 
hematocrit 28.3, sodium 131, chloride 95, BUN 41, creatinine 5.94, glucose 325, albumin 2.4. 
 Microbiology: Blood culture results in progress.  MRSA screen results negative.  Urine 
culture results positive for beta-hemolytic Streptococcus.  Intrinsic factors that delay 
wound healing: Diabetes Mellitus, End-Stage Renal Failure, Acute Metabolic Encephalopathy, 
Hypoalbuminemia, Hyperglycemia. Extrinsic factors that delay wound healing: Decreased 
mobility.





Wound Assessment:



1. Left Dorsal Lateral Foot:

Wound from fall at home (per family report), present on admission. Wound bed has 5% red 
tissue, 40% black slough, 45% yellow slough.  No odor, small yellow drainage.  Periwound 
intact.  Wound measurement area consists of 3 smaller wounds that are now connected (was 
probably one large wound that is healing). Wound measures 12.5 cm x 3.4 cm x 0.6 cm.



2. Left Dorsal Foot, Medial to Site 1:

Wound from fall at home (per family report), present on admission. Wound bed has 30% black 
slough, 70% yellow slough.  No odor, scant yellow drainage.  Periwound intact.  Wound 
measures 1.3 cm x 1.7 cm.



Recommend: Cleanse wounds with normal saline.  Apply moisture barrier cream to periwounds.  
Apply Venelex to wound beds.  Cover with nonadhesive foam dressings.  Wrap with Ondina wrap.  
Perform wound care daily, and as needed for dressing soiling or dislodgment.  Offload wound 
site at all times.





3.  Left lateral malleolus:

Area of pink scar tissue, present on admission.



Recommend: Cover site with moisture barrier cream, then foam dressing perform site care 
daily, and as needed for dressing soiling or dislodgment.  Offload Malleolus at all times.





Also recommend: Reposition patient every 2 hours with pillow support and off-load pressure 
areas with pillows for pressure re-distribution. Offload, elevate and float bilateral heels 
with one pillow lengthwise under each extremity at all times. Perform skin care and monitor 
skin integrity Q shift. Use moisture barrier cream on buttocks and other moisture 
susceptible areas QID and as needed for soiling. Place patient on a low air-loss mattress.

## 2023-02-10 NOTE — NUR
JAQUAN CASA 



MELVI [facility] CALLED AND SAID THAT SHE WAS NOTIFIED THAT WE WERE UNABLE TO 
TRANSFER THE PT TONIGHT DUE TO TRANSPORT . PER THE "DON" AS JAQUAN ENCARNACIONSANGITA THEY CANT TAKE THE 
PT TILL MONDAY . 



MELVI REQUESTED TO HAVE  HERE AT [facility] CALL HER THIS WEEKEND [phone]



RN AND CHARGE NURSE NOTIFIED

-------------------------------------------------------------------------------

Addendum: 02/10/23 at 3600 by Shanti Jones CNA

-------------------------------------------------------------------------------

THE "DON" AS JAQUAN CASA IS JOSEFA

## 2023-02-10 NOTE — NUR
CLOSING NOTE 

Report given to CYNTHIA Soto. Patient sitting in bed, awake and oriented x4. Call light 
within reach, safety precautions observed.

## 2023-02-10 NOTE — NUR
[facility]



CALLED [facility] TO FOLLOW UP ON TRANSPORT . THEY SAID THAT NOTHING WAS SCHEDULED FOR PICK 
UP OR WILL CALL.

## 2023-02-10 NOTE — NUR
TELE BOX 



CALLED [facility] AND INFORM UNIT SEC TO REMIND RN THAT LEADS ARE STILL OFF

## 2023-02-10 NOTE — NUR
***** PHYSICAL THERAPY CO-SIGN *****

The Physical Therapy Progress Notes documented by Physical Therapy Assistant have been 
reviewed.



Reviewed/Co-Signed by:  Aydin Eli

Documentation Done by:HANNA SANTOYO

-------------------------------------------------------------------------------

Addendum: 02/10/23 at 0751 by Aydin Eli PT

-------------------------------------------------------------------------------

Amended: Links added.

## 2023-02-11 VITALS — SYSTOLIC BLOOD PRESSURE: 116 MMHG

## 2023-02-11 VITALS — SYSTOLIC BLOOD PRESSURE: 114 MMHG

## 2023-02-11 VITALS — SYSTOLIC BLOOD PRESSURE: 113 MMHG

## 2023-02-11 RX ADMIN — Medication SCH EA: at 12:37

## 2023-02-11 RX ADMIN — LINEZOLID SCH MLS/HR: 600 INJECTION, SOLUTION INTRAVENOUS at 09:00

## 2023-02-11 RX ADMIN — HYDROMORPHONE HYDROCHLORIDE PRN MG: 8 TABLET ORAL at 06:02

## 2023-02-11 RX ADMIN — Medication SCH EA: at 06:05

## 2023-02-11 RX ADMIN — HUMAN INSULIN PRN UNITS: 100 INJECTION, SOLUTION SUBCUTANEOUS at 12:44

## 2023-02-11 RX ADMIN — HUMAN INSULIN PRN UNITS: 100 INJECTION, SOLUTION SUBCUTANEOUS at 06:08

## 2023-02-11 RX ADMIN — ATORVASTATIN CALCIUM SCH MG: 10 TABLET, FILM COATED ORAL at 08:22

## 2023-02-11 NOTE — NUR
CONFIRMED WITH [facility] FOR A  TIME AT 1600 GOING TO [place], [address] 
[address].  SPOKE TO

DAYANA OF [facility].

## 2023-02-11 NOTE — NUR
CALLED [facility] TO GIVE REPORT, SPOKE WITH SHAE MOELLER STATING " I ALREADY GOT 
REPORT". PATIENT WAITING FOR AMBULATE PICKUP BETWEEN 1430-1500PM

## 2023-02-11 NOTE — NUR
D/C Patient

Discharge to [facility] in stable condition, no signs of distress. Right AV shunt intact. 
Report given to facility by previous nurse. Discharge package and educational material and 
all belongings sent with patient. Patient  ambulance [facility]

## 2023-02-11 NOTE — NUR
DISPATCH OF [facility], DAYANA CALLED TO ADVISE THAT  TIME IS BETWEEN 1430 TO 1500.  CYNTHIA BAIG WAS NOTIFIED.

## 2023-02-11 NOTE — NUR
[facility] CALLED TO INFORM THAT PT CAN BE TRANSFERRED 
TODAY AFTER 1600.

## 2023-02-11 NOTE — NUR
Initial

Received patient awake in bed, A/O x2 follow simple command. Respiration even and unlabored, 
no signs of pain/SOB/distress. No IV access. All safety precaution secured, bed in low 
position and call light w/in reached.

## 2023-02-11 NOTE — NUR
PATIENT WAS NOT DC'D DUE TO TRANSPORTATION ISSUES, THIS NURSE NOTIFIED THAT PATIENT WOULD 
NOT BE DC UNTIL MONDAY AT THE EARLIEST AS THERE IS NO ONE TO ADMIT HER AT THE RECEIVING 
FACILITY. PATIENT VSS, BS HAVE BEEN ELEVATED AND S/S GIVEN AS ORDERED. PATIENT C/O PAIN AND 
PRN TYLENOL WAS GIVEN AND HELPFUL. PATIENT REFUSED [facility] STATING THAT THEY WERE HURTING HER 
LEGS. PATIENT APPEARS TO BE IMPROVING AND RESTED MORE DURING THIS SHIFT THAN THE LAST 3 DAYS 
THIS NURSE HAS CARED FOR HER. PATIENT CURRENTLY IN BED RESTING, WAITING AND ASKING HOW LONG 
UNTIL BREAKFAST. ENDORSED TO THIS NURSE TO HAVE CASE MANAGEMENT CALL [facility] TO MAKE 
ARRANGEMENTS FOR PATIENT TO BE TRANSFERRED MONDAY.



PER PREVIOUS NOTE:

MELVI REQUESTED TO HAVE  HERE AT [facility] CALL HER THIS WEEKEND [phone]

## 2023-02-13 NOTE — NUR
***** PHYSICAL THERAPY CO-SIGN *****

The Physical Therapy Progress Notes documented by Physical Therapy Assistant have been 
reviewed.



Reviewed/Co-Signed by:  Aydin Eli

Documentation Done by:HANNA SANTOYO

-------------------------------------------------------------------------------

Addendum: 02/13/23 at 1515 by Aydin Eli PT

-------------------------------------------------------------------------------

Amended: Links added.
